# Patient Record
Sex: MALE | Race: WHITE | NOT HISPANIC OR LATINO
[De-identification: names, ages, dates, MRNs, and addresses within clinical notes are randomized per-mention and may not be internally consistent; named-entity substitution may affect disease eponyms.]

---

## 2019-01-11 PROBLEM — Z00.00 ENCOUNTER FOR PREVENTIVE HEALTH EXAMINATION: Status: ACTIVE | Noted: 2019-01-11

## 2019-01-15 ENCOUNTER — APPOINTMENT (OUTPATIENT)
Dept: ENDOCRINOLOGY | Facility: CLINIC | Age: 54
End: 2019-01-15
Payer: MEDICAID

## 2019-01-15 VITALS
SYSTOLIC BLOOD PRESSURE: 132 MMHG | HEART RATE: 96 BPM | DIASTOLIC BLOOD PRESSURE: 80 MMHG | WEIGHT: 242 LBS | BODY MASS INDEX: 33.88 KG/M2 | HEIGHT: 71 IN

## 2019-01-15 DIAGNOSIS — Z82.49 FAMILY HISTORY OF ISCHEMIC HEART DISEASE AND OTHER DISEASES OF THE CIRCULATORY SYSTEM: ICD-10-CM

## 2019-01-15 DIAGNOSIS — Z87.898 PERSONAL HISTORY OF OTHER SPECIFIED CONDITIONS: ICD-10-CM

## 2019-01-15 DIAGNOSIS — Z82.3 FAMILY HISTORY OF STROKE: ICD-10-CM

## 2019-01-15 DIAGNOSIS — F17.200 NICOTINE DEPENDENCE, UNSPECIFIED, UNCOMPLICATED: ICD-10-CM

## 2019-01-15 DIAGNOSIS — Z78.9 OTHER SPECIFIED HEALTH STATUS: ICD-10-CM

## 2019-01-15 DIAGNOSIS — Z87.39 PERSONAL HISTORY OF OTHER DISEASES OF THE MUSCULOSKELETAL SYSTEM AND CONNECTIVE TISSUE: ICD-10-CM

## 2019-01-15 DIAGNOSIS — Z86.39 PERSONAL HISTORY OF OTHER ENDOCRINE, NUTRITIONAL AND METABOLIC DISEASE: ICD-10-CM

## 2019-01-15 PROCEDURE — 99213 OFFICE O/P EST LOW 20 MIN: CPT

## 2019-01-15 RX ORDER — CHROMIUM 200 MCG
1000 TABLET ORAL
Refills: 0 | Status: ACTIVE | COMMUNITY

## 2019-01-15 RX ORDER — PRAVASTATIN SODIUM 20 MG/1
20 TABLET ORAL
Refills: 0 | Status: ACTIVE | COMMUNITY

## 2019-01-15 NOTE — HISTORY OF PRESENT ILLNESS
[FreeTextEntry1] : January 15, 2019\par \par \par \par \par Taking methimazole 5 mg daily.\par Recent labs at [facility] show suppressed TSH.\par Plan:  Increase methiamzole to 5 mg tablets:  one on odd days, two on even days.\par Repeat labs at [facility] before next visit in April.  \par To [facility] in [place] for refills.  \par \par Previous notes from [employer] appended below.\par \par  Feb 1, 2018\par            .\par            PCP: Dr. Lilly fax [phone]\par            .\par            CC: Hyperthyroid\par             12/19/2011 -diagnosis (RAIU 46 %, TSI and TBII elevated)\par             u/s at MRI 11/2012 and 2/2013: simple goiter\par             1/2012 - started methimazole\par             Elevated BMI (but losing weight)\par            .\par            Taking methimazole 5 mg x 8 tabs a week.\par            1/27/2018\par            TSH 0.49\par             (<140)\par            .\par            Impression: Doing well.\par            Declines I-131\par            .\par            Pllan: Same Rx\par            Warned of potential side effects of methimazole. \par            .\par            .\par            ==\par            .\par            Oct 4, 2017\par            .\par            PCP: Dr. Lilly fax [phone]\par            .\par            CC: Hyperthyroid\par             12/19/2011 -diagnosis (RAIU 46 %, TSI and TBII elevated)\par             u/s at MRI 11/2012 and 2/2013: simple goiter\par             1/2012 - started methimazole\par             Elevated BMI (but losing weight)\par            .\par            Taking methimazole 5 mg x 9 tabs a week.\par            TFTs in good range.\par            .\par            OGTT completely normal.\par            .\par            Has been exercise and has lost weight. \par            .\par            Plan: Dec methimazole to 8 ills a week \par            ROV in January.\par            .\par            ==\par            .\par            June 21, 2017\par            .\par            PCP: Dr. Lilly fax [phone]\par            .\par            CC: Hyperthyroid\par             12/19/2011 -diagnosis (RAIU 46 %, TSI and TBII elevated)\par             u/s at MRI 11/2012 and 2/2013: simple goiter\par             1/2012 - started methimazole\par             Elevated BMI\par            .\par             Smoker\par             A1c 6.0\par             Decreased HDL/Elevated LDL\par            .\par            Taking methimazole 5 mg tablets.\par            Because of lowish TSH on 8 tablets a week, at last visit in January, dose incresed to 9 tablets a week.\par            .\par            Recent labs show TSH in normal range.\par            .\par            Impression: Hyperthyroidism well controlled on methimazole 5 mg tablets, 9 tablets a week. Has not gone into remission despite treatment with methimazole in early 2012 and thus he is not likely to go into a remission. He is awasre that his family history, elevted BMI, elevated LDL, lowe HDL are all cardiac risk factors and will discuss further with Dr. Lilly. \par            .\par            Plan: Same Rx. Formal 2 hour OGTT. \par            .\par            .\par            ==\par            .\par            January 30, 2017\par            .\par            PCP: Dr. iLlly fax [phone]\par            .\par            CC: Hyperthyroid\par             12/19/2011 -diagnosis (RAIU 46 %, TSI and TBII elevated)\par             u/s at MRI 11/2012 and 2/2013: simple goiter\par             1/2012 - started methimazole\par             Elevated BMI\par            .\par             Smoker\par            .\par            Last visit TSH low on methimazole 5 mg x 8 tabs/week so\par            dose inc to 5 mg x 9 tabs/week and\par            1/26/2017 TFTs show TSH 1.3\par            \par            HDL 31 \par            .\par            Impression: Thyroi controlled on 9 tabls methimazole a week.\par            .\par            Plan: methiamzole 5 mg x 9 tabs aweek.\par            ROV 3 months. - \par            .\par            ==\par            .\par            October 18, 2016\par            .\par            PCP: Dr. Lilly fax [phone]\par            .\par            CC: Hyperthyroid\par             12/19/2011 -diagnosis (RAIU 46 %, TSI and TBII elevated)\par             u/s at MRI 11/2012 and 2/2013: simple goiter\par             1/2012 - started methimazole\par             Elevated BMI\par            .\par             Smoker\par            .\par            Doing well on methimazole 5 mg, two on Sunday.\par            TSH is slightly low.\par            .\par            Plan: methimazole 5 mg 8 a week to 9 a week.\par            ROV -\par            .\par            ==\par            .\par            August 1, 2016\par            .\par            PCP: Dr. Lilly fax [phone]\par            .\par            CC: Hyperthyroid\par             12/19/2011 -diagnosis (RAIU 46 %, TSI and TBII elevated)\par             u/s at MRI 11/2012 and 2/2013: simple goiter\par             1/2012 - started methimazole\par             Elevated BMI\par            .\par            50 yo returns for hyperthyroidism due to Graves' disease, diagnosed in December of 2011 and started on methimazole in January of 2012. \par            He got dose of methimazole down to 2.5 mg alternating with 5 mg, but then TFTs went back up. \par            Last here in May and is on methimazole 5 mg daily.\par            Most recent blood tests ([facility]) on\par            7/29/2016 included\par            TSH 0.25 ***\par            T3 112\par            T4 8. \par            .\par            He feels well.\par            .\par            Impression: Not going into remission and not likely to. He is well aware of this, but prefers to stay on methimazole. He understands the risks of methimazole and the risks of thyroid overactivity. He understands that I-131 and surgery are definitive options to treat the thyroid overactivity.\par            .\par            Plan: As per his wishes, continue methimazole 5 mg daily, but take two pills on Sundays. Updated TFTs, LFTs, CBC within 8 weeks. Thank you. -jh\par            .\par            ==\par            .\par            May 10, 2016\par            .\par            PCP: Dr. Lilly fax [phone]\par            .\par            CC: Hyperthyroid\par             12/19/2011 -diagnosis\par             1/2012 - started methimazole\par            .Feels well on methimazole 5 mg daily.\par            .\par            Impression: Not going into remission.\par            Not interested in I-131 or surgery.\par            Aware of risks of methimazole\par            Suppressed TSH indicates he is still mildly hypethyroid. \par            .\par            Plan: Updated TFTs before next visit in July. \par            .\par            ==\par            .\par            January 27, 2016\par            .\par            PCP: Dr. Lilly fax [phone]\par            .\par            CC: Hyperthyroid\par             12/19/2011 -diagnosis\par             1/2012 - started methimazole\par            .\par            He has been noting occasional palpitations,\par            occasional shakiness.\par            .\par            Plan: Increase methimazole to 5 mg daily for now and repeat TFTs in 10 weeks. He is not yet interested in I-131\par            ROV in 11 weeks. -jh\par            .\par            ==\par            .\par            July 24, 2015\par            .\par            PCP: Dr. Lilly fax [phone]\par            .\par            CC: Hyperthyroid\par             12/19/2011 -diagnosis\par             1/2012 - started methimazole\par            .\par             Remains on methimazole 5 mg daily.\par            TFTs in good range.\par            Plan: Decrease methimazole to 2.5 mg alternating with 5 mg and\par            ROV after next TFTs in a few months\par            .\par            ==\par            .\par            April 3, 2015\par            .\par            PCP: Dr. Lilly fax [phone]\par            .\par            CC: Hyperthyroid\par             12/19/2011 -diagnosis\par             1/2012 - started methimazole\par            .\par            Today he reports he feels well \par            .\par            Imp: On methimazole 10 alt with 5.\par            .\par            Plan: 5 mg daily.\par            ROV in July.\par            .\par            ==\par            Dec 19, 2014\par            PCP: Dr. Lilly fax [phone]\par            CC: Hyperthyroid\par             12/19/2011 -diagnosis\par             1/2012 - started methimazole\par            .\par            Currently taking methimazole 10 mg alt with 5 mg last vist, dec from\par            10 mg daily.\par            Recenr TSI elev at 250 (< 140%)\par            T4 7.9 TSH 1.9\par            .\par            Impression: On methimazole for almost 3 years - not in remission but hyperthyroidism is controlled on 10 mg al with 5. TSI still elevated, also against chances of remission. \par            .\par            Plan: I-131 offered and declined. Same Rx. andn ROV in 3 1/2 months after labs.\par            .\par            ===\par            Sept 19, 2014\par            PCP: Dr. Lilly\par            CC: Hyperthyroid\par             12/19/2011 - Thyroid scan 46% update\par             TSI and TBII both elevated.\par             1/2012 - started methimazole\par            .\par            Currently on methimazole 10 mg daily. \par            In Feb 2014, TSH suppressed on 5 mg/day.\par            .\par            Recent TFTs are within normal (on 10 mg daily.\par            Sonogram Nov 2012 - normal.\par            .\par            Impression: As he did not go into a remission after two years of methimazole, the probability of a long term remission at this point is much decreased. However, there is room to lower the dose of methimazole from 10 mg daily.\par            .\par            Plan: Decrease to 10 mg alt with 5 mg, repeat labs in Nov and ROV Dec.\par

## 2019-03-06 ENCOUNTER — RECORD ABSTRACTING (OUTPATIENT)
Age: 54
End: 2019-03-06

## 2019-03-06 DIAGNOSIS — E05.00 THYROTOXICOSIS WITH DIFFUSE GOITER W/OUT THYROTOXIC CRISIS OR STORM: ICD-10-CM

## 2019-05-24 ENCOUNTER — APPOINTMENT (OUTPATIENT)
Dept: ENDOCRINOLOGY | Facility: CLINIC | Age: 54
End: 2019-05-24
Payer: MEDICAID

## 2019-05-24 VITALS
DIASTOLIC BLOOD PRESSURE: 76 MMHG | WEIGHT: 235 LBS | BODY MASS INDEX: 32.9 KG/M2 | HEART RATE: 111 BPM | SYSTOLIC BLOOD PRESSURE: 138 MMHG | HEIGHT: 71 IN

## 2019-05-24 PROCEDURE — 99213 OFFICE O/P EST LOW 20 MIN: CPT

## 2019-05-27 NOTE — ASSESSMENT
[FreeTextEntry1] : ~\par I would prefer to treat him with I-131 at this point, but he would like to continue methimazole.\par \par ABDON in August.

## 2019-05-27 NOTE — HISTORY OF PRESENT ILLNESS
[FreeTextEntry1] : May 24, 2019\par    PCP: Dr. Lilly fax [phone]\par            .\par            CC: Hyperthyroid\par             12/19/2011 -diagnosis (RAIU 46 %, TSI and TBII elevated)\par             u/s at MRI 11/2012 and 2/2013: simple goiter\par  1/2012 - started methimazole\par             Elevated BMI (but losing weight)\par            .\par          Taking methimazole 5 mg, one daily alternating with two daily.\par \par \par Today I reminded him of the potential side effects of methimazole including rash, hives, arthritis, hepatitis, agranulocytosis and death.    He prefers to take methimazole rather than the know safe option of I-131 or even surgery.\par So,he will increase the methimazole to 5 mg, two tablets daily, repeat labs before next visit in August.  \par As he reports:    "I feel great".  \par \par \par January 15, 2019\par \par \par \par \par Taking methimazole 5 mg daily.\par Recent labs at [facility] show suppressed TSH.\par Plan:  Increase methiamzole to 5 mg tablets:  one on odd days, two on even days.\par Repeat labs at [facility] before next visit in April.  \par To [facility] in [place] for refills.  \par \par Previous notes from [employer] appended below.\par \par  Feb 1, 2018\par            .\par            PCP: Dr. Lilly fax [phone]\par            .\par            CC: Hyperthyroid\par             12/19/2011 -diagnosis (RAIU 46 %, TSI and TBII elevated)\par             u/s at MRI 11/2012 and 2/2013: simple goiter\par             1/2012 - started methimazole\par             Elevated BMI (but losing weight)\par            .\par            Taking methimazole 5 mg x 8 tabs a week.\par            1/27/2018\par            TSH 0.49\par             (<140)\par            .\par            Impression: Doing well.\par            Declines I-131\par            .\par            Pllan: Same Rx\par            Warned of potential side effects of methimazole. \par            .\par            .\par            ==\par            .\par            Oct 4, 2017\par            .\par            PCP: Dr. Lilly fax [phone]\par            .\par            CC: Hyperthyroid\par             12/19/2011 -diagnosis (RAIU 46 %, TSI and TBII elevated)\par             u/s at MRI 11/2012 and 2/2013: simple goiter\par             1/2012 - started methimazole\par             Elevated BMI (but losing weight)\par            .\par            Taking methimazole 5 mg x 9 tabs a week.\par            TFTs in good range.\par            .\par            OGTT completely normal.\par            .\par            Has been exercise and has lost weight. \par            .\par            Plan: Dec methimazole to 8 ills a week \par            ROV in January.\par            .\par            ==\par            .\par            June 21, 2017\par            .\par            PCP: Dr. Lilly fax [phone]\par            .\par            CC: Hyperthyroid\par             12/19/2011 -diagnosis (RAIU 46 %, TSI and TBII elevated)\par             u/s at MRI 11/2012 and 2/2013: simple goiter\par             1/2012 - started methimazole\par             Elevated BMI\par            .\par             Smoker\par             A1c 6.0\par             Decreased HDL/Elevated LDL\par            .\par            Taking methimazole 5 mg tablets.\par            Because of lowish TSH on 8 tablets a week, at last visit in January, dose incresed to 9 tablets a week.\par            .\par            Recent labs show TSH in normal range.\par            .\par            Impression: Hyperthyroidism well controlled on methimazole 5 mg tablets, 9 tablets a week. Has not gone into remission despite treatment with methimazole in early 2012 and thus he is not likely to go into a remission. He is awasre that his family history, elevted BMI, elevated LDL, lowe HDL are all cardiac risk factors and will discuss further with Dr. Lilly. \par            .\par            Plan: Same Rx. Formal 2 hour OGTT. \par            .\par            .\par            ==\par            .\par            January 30, 2017\par            .\par            PCP: Dr. Lilly fax [phone]\par            .\par            CC: Hyperthyroid\par             12/19/2011 -diagnosis (RAIU 46 %, TSI and TBII elevated)\par             u/s at MRI 11/2012 and 2/2013: simple goiter\par             1/2012 - started methimazole\par             Elevated BMI\par            .\par             Smoker\par            .\par            Last visit TSH low on methimazole 5 mg x 8 tabs/week so\par            dose inc to 5 mg x 9 tabs/week and\par            1/26/2017 TFTs show TSH 1.3\par            \par            HDL 31 \par            .\par            Impression: Thyroi controlled on 9 tabls methimazole a week.\par            .\par            Plan: methiamzole 5 mg x 9 tabs aweek.\par            ROV 3 months. - \par            .\par            ==\par            .\par            October 18, 2016\par            .\par            PCP: Dr. Lilly fax [phone]\par            .\par            CC: Hyperthyroid\par             12/19/2011 -diagnosis (RAIU 46 %, TSI and TBII elevated)\par             u/s at MRI 11/2012 and 2/2013: simple goiter\par             1/2012 - started methimazole\par             Elevated BMI\par            .\par             Smoker\par            .\par            Doing well on methimazole 5 mg, two on Sunday.\par            TSH is slightly low.\par            .\par            Plan: methimazole 5 mg 8 a week to 9 a week.\par            ROV -\par            .\par            ==\par            .\par            August 1, 2016\par            .\par            PCP: Dr. Lilly fax [phone]\par            .\par            CC: Hyperthyroid\par             12/19/2011 -diagnosis (RAIU 46 %, TSI and TBII elevated)\par             u/s at MRI 11/2012 and 2/2013: simple goiter\par             1/2012 - started methimazole\par             Elevated BMI\par            .\par            50 yo returns for hyperthyroidism due to Graves' disease, diagnosed in December of 2011 and started on methimazole in January of 2012. \par            He got dose of methimazole down to 2.5 mg alternating with 5 mg, but then TFTs went back up. \par            Last here in May and is on methimazole 5 mg daily.\par            Most recent blood tests ([facility]) on\par            7/29/2016 included\par            TSH 0.25 ***\par            T3 112\par            T4 8. \par            .\par            He feels well.\par            .\par            Impression: Not going into remission and not likely to. He is well aware of this, but prefers to stay on methimazole. He understands the risks of methimazole and the risks of thyroid overactivity. He understands that I-131 and surgery are definitive options to treat the thyroid overactivity.\par            .\par            Plan: As per his wishes, continue methimazole 5 mg daily, but take two pills on Sundays. Updated TFTs, LFTs, CBC within 8 weeks. Thank you. -jh\par            .\par            ==\par            .\par            May 10, 2016\par            .\par            PCP: Dr. Lilly fax [phone]\par            .\par            CC: Hyperthyroid\par             12/19/2011 -diagnosis\par             1/2012 - started methimazole\par            .Feels well on methimazole 5 mg daily.\par            .\par            Impression: Not going into remission.\par            Not interested in I-131 or surgery.\par            Aware of risks of methimazole\par            Suppressed TSH indicates he is still mildly hypethyroid. \par            .\par            Plan: Updated TFTs before next visit in July. \par            .\par            ==\par            .\par            January 27, 2016\par            .\par            PCP: Dr. Lilly fax [phone]\par            .\par            CC: Hyperthyroid\par             12/19/2011 -diagnosis\par             1/2012 - started methimazole\par            .\par            He has been noting occasional palpitations,\par            occasional shakiness.\par            .\par            Plan: Increase methimazole to 5 mg daily for now and repeat TFTs in 10 weeks. He is not yet interested in I-131\par            ROV in 11 weeks. -jh\par            .\par            ==\par            .\par            July 24, 2015\par            .\par            PCP: Dr. Lilly fax [phone]\par            .\par            CC: Hyperthyroid\par             12/19/2011 -diagnosis\par             1/2012 - started methimazole\par            .\par             Remains on methimazole 5 mg daily.\par            TFTs in good range.\par            Plan: Decrease methimazole to 2.5 mg alternating with 5 mg and\par            ROV after next TFTs in a few months\par            .\par            ==\par            .\par            April 3, 2015\par            .\par            PCP: Dr. Lilly fax [phone]\par            .\par            CC: Hyperthyroid\par             12/19/2011 -diagnosis\par             1/2012 - started methimazole\par            .\par            Today he reports he feels well \par            .\par            Imp: On methimazole 10 alt with 5.\par            .\par            Plan: 5 mg daily.\par            ROV in July.\par            .\par            ==\par            Dec 19, 2014\par            PCP: Dr. Lilly fax [phone]\par            CC: Hyperthyroid\par             12/19/2011 -diagnosis\par             1/2012 - started methimazole\par            .\par            Currently taking methimazole 10 mg alt with 5 mg last vist, dec from\par            10 mg daily.\par            Recenr TSI elev at 250 (< 140%)\par            T4 7.9 TSH 1.9\par            .\par            Impression: On methimazole for almost 3 years - not in remission but hyperthyroidism is controlled on 10 mg al with 5. TSI still elevated, also against chances of remission. \par            .\par            Plan: I-131 offered and declined. Same Rx. andn ROV in 3 1/2 months after labs.\par            .\par            ===\par            Sept 19, 2014\par            PCP: Dr. Lilly\par            CC: Hyperthyroid\par             12/19/2011 - Thyroid scan 46% update\par             TSI and TBII both elevated.\par             1/2012 - started methimazole\par            .\par            Currently on methimazole 10 mg daily. \par            In Feb 2014, TSH suppressed on 5 mg/day.\par            .\par            Recent TFTs are within normal (on 10 mg daily.\par            Sonogram Nov 2012 - normal.\par            .\par            Impression: As he did not go into a remission after two years of methimazole, the probability of a long term remission at this point is much decreased. However, there is room to lower the dose of methimazole from 10 mg daily.\par            .\par            Plan: Decrease to 10 mg alt with 5 mg, repeat labs in Nov and ROV Dec.\par

## 2019-08-23 ENCOUNTER — APPOINTMENT (OUTPATIENT)
Dept: ENDOCRINOLOGY | Facility: CLINIC | Age: 54
End: 2019-08-23

## 2019-10-02 ENCOUNTER — APPOINTMENT (OUTPATIENT)
Dept: ENDOCRINOLOGY | Facility: CLINIC | Age: 54
End: 2019-10-02
Payer: MEDICAID

## 2019-10-02 VITALS
SYSTOLIC BLOOD PRESSURE: 140 MMHG | WEIGHT: 225 LBS | HEART RATE: 100 BPM | DIASTOLIC BLOOD PRESSURE: 76 MMHG | BODY MASS INDEX: 31.5 KG/M2 | HEIGHT: 71 IN

## 2019-10-02 PROCEDURE — 99214 OFFICE O/P EST MOD 30 MIN: CPT

## 2019-10-02 NOTE — PHYSICAL EXAM
[Alert] : alert [Well Nourished] : well nourished [No Acute Distress] : no acute distress [Well Developed] : well developed [Normal Sclera/Conjunctiva] : normal sclera/conjunctiva [EOMI] : extra ocular movement intact [No Proptosis] : no proptosis [Thyroid Not Enlarged] : the thyroid was not enlarged [Normal Oropharynx] : the oropharynx was normal [No Thyroid Nodules] : there were no palpable thyroid nodules [No Respiratory Distress] : no respiratory distress [No Accessory Muscle Use] : no accessory muscle use [Clear to Auscultation] : lungs were clear to auscultation bilaterally [Normal Rate] : heart rate was normal  [Regular Rhythm] : with a regular rhythm [Normal S1, S2] : normal S1 and S2 [Pedal Pulses Normal] : the pedal pulses are present [No Edema] : there was no peripheral edema [Not Tender] : non-tender [Normal Bowel Sounds] : normal bowel sounds [Soft] : abdomen soft [Not Distended] : not distended [Post Cervical Nodes] : posterior cervical nodes [Anterior Cervical Nodes] : anterior cervical nodes [Axillary Nodes] : axillary nodes [Normal] : normal and non tender [No Spinal Tenderness] : no spinal tenderness [Spine Straight] : spine straight [No Stigmata of Cushings Syndrome] : no stigmata of cushings syndrome [Normal Gait] : normal gait [Normal Strength/Tone] : muscle strength and tone were normal [No Rash] : no rash [Normal Reflexes] : deep tendon reflexes were 2+ and symmetric [Oriented x3] : oriented to person, place, and time [No Tremors] : no tremors [Acanthosis Nigricans] : no acanthosis nigricans

## 2019-10-02 NOTE — ASSESSMENT
[FreeTextEntry1] : ~\par §  Long term hyperthyoridism.\par He prefers to stay on methimazole.\par ROV January.

## 2019-10-02 NOTE — HISTORY OF PRESENT ILLNESS
[FreeTextEntry1] : Oct 02, 2019\par \par PCP: Dr. Lilly fax [phone]\par            .\par            CC: Hyperthyroid\par             12/19/2011 -diagnosis (RAIU 46 %, TSI and TBII elevated)\par             u/s at MRI 11/2012 and 2/2013: simple goiter\par  1/2012 - started methimazole\par             Elevated BMI (but losing weight)\par            .\par          Taking methimazole 5 mg, one daily alternating with two daily.\par \par Now taking methimazole two 5 mg daily to [place] [facility].  \par Went for labs yesterday....not back.\par \par Plan:  Same Rx and labs before January visit.  \par \par \par \par \par \par May 24, 2019\par    PCP: Dr. Lilly fax [phone]\par            .\par            CC: Hyperthyroid\par             12/19/2011 -diagnosis (RAIU 46 %, TSI and TBII elevated)\par             u/s at MRI 11/2012 and 2/2013: simple goiter\par  1/2012 - started methimazole\par             Elevated BMI (but losing weight)\par            .\par          Taking methimazole 5 mg, one daily alternating with two daily.\par \par \par Today I reminded him of the potential side effects of methimazole including rash, hives, arthritis, hepatitis, agranulocytosis and death.    He prefers to take methimazole rather than the know safe option of I-131 or even surgery.\par So,he will increase the methimazole to 5 mg, two tablets daily, repeat labs before next visit in August.  \par As he reports:    "I feel great".  \par \par \par January 15, 2019\par \par \par \par \par Taking methimazole 5 mg daily.\par Recent labs at [facility] show suppressed TSH.\par Plan:  Increase methiamzole to 5 mg tablets:  one on odd days, two on even days.\par Repeat labs at [facility] before next visit in April.  \par To [facility] in [place] for refills.  \par \par Previous notes from [employer] appended below.\par \par  Feb 1, 2018\par            .\par            PCP: Dr. Lilly fax [phone]\par            .\par            CC: Hyperthyroid\par             12/19/2011 -diagnosis (RAIU 46 %, TSI and TBII elevated)\par             u/s at MRI 11/2012 and 2/2013: simple goiter\par             1/2012 - started methimazole\par             Elevated BMI (but losing weight)\par            .\par            Taking methimazole 5 mg x 8 tabs a week.\par            1/27/2018\par            TSH 0.49\par             (<140)\par            .\par            Impression: Doing well.\par            Declines I-131\par            .\par            Pllan: Same Rx\par            Warned of potential side effects of methimazole. \par            .\par            .\par            ==\par            .\par            Oct 4, 2017\par            .\par            PCP: Dr. Lilly fax [phone]\par            .\par            CC: Hyperthyroid\par             12/19/2011 -diagnosis (RAIU 46 %, TSI and TBII elevated)\par             u/s at MRI 11/2012 and 2/2013: simple goiter\par             1/2012 - started methimazole\par             Elevated BMI (but losing weight)\par            .\par            Taking methimazole 5 mg x 9 tabs a week.\par            TFTs in good range.\par            .\par            OGTT completely normal.\par            .\par            Has been exercise and has lost weight. \par            .\par            Plan: Dec methimazole to 8 ills a week \par            ROV in January.\par            .\par            ==\par            .\par            June 21, 2017\par            .\par            PCP: Dr. Lilly fax [phone]\par            .\par            CC: Hyperthyroid\par             12/19/2011 -diagnosis (RAIU 46 %, TSI and TBII elevated)\par             u/s at MRI 11/2012 and 2/2013: simple goiter\par             1/2012 - started methimazole\par             Elevated BMI\par            .\par             Smoker\par             A1c 6.0\par             Decreased HDL/Elevated LDL\par            .\par            Taking methimazole 5 mg tablets.\par            Because of lowish TSH on 8 tablets a week, at last visit in January, dose incresed to 9 tablets a week.\par            .\par            Recent labs show TSH in normal range.\par            .\par            Impression: Hyperthyroidism well controlled on methimazole 5 mg tablets, 9 tablets a week. Has not gone into remission despite treatment with methimazole in early 2012 and thus he is not likely to go into a remission. He is awasre that his family history, elevted BMI, elevated LDL, lowe HDL are all cardiac risk factors and will discuss further with Dr. Lilly. \par            .\par            Plan: Same Rx. Formal 2 hour OGTT. \par            .\par            .\par            ==\par            .\par            January 30, 2017\par            .\par            PCP: Dr. Lilly fax [phone]\par            .\par            CC: Hyperthyroid\par             12/19/2011 -diagnosis (RAIU 46 %, TSI and TBII elevated)\par             u/s at MRI 11/2012 and 2/2013: simple goiter\par             1/2012 - started methimazole\par             Elevated BMI\par            .\par             Smoker\par            .\par            Last visit TSH low on methimazole 5 mg x 8 tabs/week so\par            dose inc to 5 mg x 9 tabs/week and\par            1/26/2017 TFTs show TSH 1.3\par            \par            HDL 31 \par            .\par            Impression: Thyroi controlled on 9 tabls methimazole a week.\par            .\par            Plan: methiamzole 5 mg x 9 tabs aweek.\par            ROV 3 months. - \par            .\par            ==\par            .\par            October 18, 2016\par            .\par            PCP: Dr. Lilly fax [phone]\par            .\par            CC: Hyperthyroid\par             12/19/2011 -diagnosis (RAIU 46 %, TSI and TBII elevated)\par             u/s at MRI 11/2012 and 2/2013: simple goiter\par             1/2012 - started methimazole\par             Elevated BMI\par            .\par             Smoker\par            .\par            Doing well on methimazole 5 mg, two on Sunday.\par            TSH is slightly low.\par            .\par            Plan: methimazole 5 mg 8 a week to 9 a week.\par            ROV -\par            .\par            ==\par            .\par            August 1, 2016\par            .\par            PCP: Dr. Lilly fax [phone]\par            .\par            CC: Hyperthyroid\par             12/19/2011 -diagnosis (RAIU 46 %, TSI and TBII elevated)\par             u/s at MRI 11/2012 and 2/2013: simple goiter\par             1/2012 - started methimazole\par             Elevated BMI\par            .\par            52 yo returns for hyperthyroidism due to Graves' disease, diagnosed in December of 2011 and started on methimazole in January of 2012. \par            He got dose of methimazole down to 2.5 mg alternating with 5 mg, but then TFTs went back up. \par            Last here in May and is on methimazole 5 mg daily.\par            Most recent blood tests ([facility]) on\par            7/29/2016 included\par            TSH 0.25 ***\par            T3 112\par            T4 8. \par            .\par            He feels well.\par            .\par            Impression: Not going into remission and not likely to. He is well aware of this, but prefers to stay on methimazole. He understands the risks of methimazole and the risks of thyroid overactivity. He understands that I-131 and surgery are definitive options to treat the thyroid overactivity.\par            .\par            Plan: As per his wishes, continue methimazole 5 mg daily, but take two pills on Sundays. Updated TFTs, LFTs, CBC within 8 weeks. Thank you. -jh\par            .\par            ==\par            .\par            May 10, 2016\par            .\par            PCP: Dr. Lilly fax [phone]\par            .\par            CC: Hyperthyroid\par             12/19/2011 -diagnosis\par             1/2012 - started methimazole\par            .Feels well on methimazole 5 mg daily.\par            .\par            Impression: Not going into remission.\par            Not interested in I-131 or surgery.\par            Aware of risks of methimazole\par            Suppressed TSH indicates he is still mildly hypethyroid. \par            .\par            Plan: Updated TFTs before next visit in July. \par            .\par            ==\par            .\par            January 27, 2016\par            .\par            PCP: Dr. Lilly fax [phone]\par            .\par            CC: Hyperthyroid\par             12/19/2011 -diagnosis\par             1/2012 - started methimazole\par            .\par            He has been noting occasional palpitations,\par            occasional shakiness.\par            .\par            Plan: Increase methimazole to 5 mg daily for now and repeat TFTs in 10 weeks. He is not yet interested in I-131\par            ROV in 11 weeks. -jh\par            .\par            ==\par            .\par            July 24, 2015\par            .\par            PCP: Dr. Lilly fax [phone]\par            .\par            CC: Hyperthyroid\par             12/19/2011 -diagnosis\par             1/2012 - started methimazole\par            .\par             Remains on methimazole 5 mg daily.\par            TFTs in good range.\par            Plan: Decrease methimazole to 2.5 mg alternating with 5 mg and\par            ROV after next TFTs in a few months\par            .\par            ==\par            .\par            April 3, 2015\par            .\par            PCP: Dr. Lilly fax [phone]\par            .\par            CC: Hyperthyroid\par             12/19/2011 -diagnosis\par             1/2012 - started methimazole\par            .\par            Today he reports he feels well \par            .\par            Imp: On methimazole 10 alt with 5.\par            .\par            Plan: 5 mg daily.\par            ROV in July.\par            .\par            ==\par            Dec 19, 2014\par            PCP: Dr. Lilly fax [phone]\par            CC: Hyperthyroid\par             12/19/2011 -diagnosis\par             1/2012 - started methimazole\par            .\par            Currently taking methimazole 10 mg alt with 5 mg last vist, dec from\par            10 mg daily.\par            Recenr TSI elev at 250 (< 140%)\par            T4 7.9 TSH 1.9\par            .\par            Impression: On methimazole for almost 3 years - not in remission but hyperthyroidism is controlled on 10 mg al with 5. TSI still elevated, also against chances of remission. \par            .\par            Plan: I-131 offered and declined. Same Rx. andn ROV in 3 1/2 months after labs.\par            .\par            ===\par            Sept 19, 2014\par            PCP: Dr. Lilly\par            CC: Hyperthyroid\par             12/19/2011 - Thyroid scan 46% update\par             TSI and TBII both elevated.\par             1/2012 - started methimazole\par            .\par            Currently on methimazole 10 mg daily. \par            In Feb 2014, TSH suppressed on 5 mg/day.\par            .\par            Recent TFTs are within normal (on 10 mg daily.\par            Sonogram Nov 2012 - normal.\par            .\par            Impression: As he did not go into a remission after two years of methimazole, the probability of a long term remission at this point is much decreased. However, there is room to lower the dose of methimazole from 10 mg daily.\par            .\par            Plan: Decrease to 10 mg alt with 5 mg, repeat labs in Nov and ROV Dec.\par

## 2019-11-09 ENCOUNTER — RX CHANGE (OUTPATIENT)
Age: 54
End: 2019-11-09

## 2020-01-09 ENCOUNTER — APPOINTMENT (OUTPATIENT)
Dept: ENDOCRINOLOGY | Facility: CLINIC | Age: 55
End: 2020-01-09

## 2020-03-24 ENCOUNTER — APPOINTMENT (OUTPATIENT)
Dept: ENDOCRINOLOGY | Facility: CLINIC | Age: 55
End: 2020-03-24
Payer: MEDICAID

## 2020-03-24 VITALS
DIASTOLIC BLOOD PRESSURE: 80 MMHG | SYSTOLIC BLOOD PRESSURE: 120 MMHG | HEART RATE: 104 BPM | WEIGHT: 235 LBS | BODY MASS INDEX: 32.9 KG/M2 | OXYGEN SATURATION: 97 % | HEIGHT: 71 IN

## 2020-03-24 PROCEDURE — 99214 OFFICE O/P EST MOD 30 MIN: CPT

## 2020-03-24 NOTE — HISTORY OF PRESENT ILLNESS
[FreeTextEntry1] : Mar 24, 2020\par \par PCP: Dr. Lilly fax [phone]\par            .\par            CC: Hyperthyroid  12/19/2011 -diagnosis (RAIU 46 %, TSI and TBII elevated)\par             u/s at MRI 11/2012 and 2/2013: simple goiter\par               1/2012 - started methimazole\par             Elevated BMI (but losing weight)\par \par             Elevated PC BS \par            .\par            Taking methimazole 5 mg tablets, three daily.\par \par Impression:  TSh suppressed, T4 and T3 both elevated as is TSI.\par He is not interested in I-131 or thyroid surgery.  In fact, it is challenging to convince to stay on the\par methimazole since he feels so well.  \par \par Plan:  Increase methimazole from 15 mg a day to 20 mg/day. \par \par \par \par Oct 02, 2019\par \par PCP: Dr. Lilly fax [phone]\par            .\par            CC: Hyperthyroid\par             12/19/2011 -diagnosis (RAIU 46 %, TSI and TBII elevated)\par             u/s at MRI 11/2012 and 2/2013: simple goiter\par  1/2012 - started methimazole\par             Elevated BMI (but losing weight)\par            .\par          Taking methimazole 5 mg, one daily alternating with two daily.\par \par Now taking methimazole two 5 mg daily to [place] [facility].  \par Went for labs yesterday....not back.\par \par Plan:  Same Rx and labs before January visit.  \par \par \par \par \par \par May 24, 2019\par    PCP: Dr. Lilly fax [phone]\par            .\par            CC: Hyperthyroid\par             12/19/2011 -diagnosis (RAIU 46 %, TSI and TBII elevated)\par             u/s at MRI 11/2012 and 2/2013: simple goiter\par  1/2012 - started methimazole\par             Elevated BMI (but losing weight)\par            .\par          Taking methimazole 5 mg, one daily alternating with two daily.\par \par \par Today I reminded him of the potential side effects of methimazole including rash, hives, arthritis, hepatitis, agranulocytosis and death.    He prefers to take methimazole rather than the know safe option of I-131 or even surgery.\par So,he will increase the methimazole to 5 mg, two tablets daily, repeat labs before next visit in August.  \par As he reports:    "I feel great".  \par \par \par January 15, 2019\par \par \par \par \par Taking methimazole 5 mg daily.\par Recent labs at [facility] show suppressed TSH.\par Plan:  Increase methiamzole to 5 mg tablets:  one on odd days, two on even days.\par Repeat labs at [facility] before next visit in April.  \par To [facility] in [place] for refills.  \par \par Previous notes from [employer] appended below.\par \par  Feb 1, 2018\par            .\par            PCP: Dr. Lilly fax [phone]\par            .\par            CC: Hyperthyroid\par             12/19/2011 -diagnosis (RAIU 46 %, TSI and TBII elevated)\par             u/s at MRI 11/2012 and 2/2013: simple goiter\par             1/2012 - started methimazole\par             Elevated BMI (but losing weight)\par            .\par            Taking methimazole 5 mg x 8 tabs a week.\par            1/27/2018\par            TSH 0.49\par             (<140)\par            .\par            Impression: Doing well.\par            Declines I-131\par            .\par            Pllan: Same Rx\par            Warned of potential side effects of methimazole. \par            .\par            .\par            ==\par            .\par            Oct 4, 2017\par            .\par            PCP: Dr. Lilly fax [phone]\par            .\par            CC: Hyperthyroid\par             12/19/2011 -diagnosis (RAIU 46 %, TSI and TBII elevated)\par             u/s at MRI 11/2012 and 2/2013: simple goiter\par             1/2012 - started methimazole\par             Elevated BMI (but losing weight)\par            .\par            Taking methimazole 5 mg x 9 tabs a week.\par            TFTs in good range.\par            .\par            OGTT completely normal.\par            .\par            Has been exercise and has lost weight. \par            .\par            Plan: Dec methimazole to 8 ills a week \par            ROV in January.\par            .\par            ==\par            .\par            June 21, 2017\par            .\par            PCP: Dr. Lilly fax [phone]\par            .\par            CC: Hyperthyroid\par             12/19/2011 -diagnosis (RAIU 46 %, TSI and TBII elevated)\par             u/s at MRI 11/2012 and 2/2013: simple goiter\par             1/2012 - started methimazole\par             Elevated BMI\par            .\par             Smoker\par             A1c 6.0\par             Decreased HDL/Elevated LDL\par            .\par            Taking methimazole 5 mg tablets.\par            Because of lowish TSH on 8 tablets a week, at last visit in January, dose incresed to 9 tablets a week.\par            .\par            Recent labs show TSH in normal range.\par            .\par            Impression: Hyperthyroidism well controlled on methimazole 5 mg tablets, 9 tablets a week. Has not gone into remission despite treatment with methimazole in early 2012 and thus he is not likely to go into a remission. He is awasre that his family history, elevted BMI, elevated LDL, lowe HDL are all cardiac risk factors and will discuss further with Dr. Lilly. \par            .\par            Plan: Same Rx. Formal 2 hour OGTT. \par            .\par            .\par            ==\par            .\par            January 30, 2017\par            .\par            PCP: Dr. Lilly fax [phone]\par            .\par            CC: Hyperthyroid\par             12/19/2011 -diagnosis (RAIU 46 %, TSI and TBII elevated)\par             u/s at MRI 11/2012 and 2/2013: simple goiter\par             1/2012 - started methimazole\par             Elevated BMI\par            .\par             Smoker\par            .\par            Last visit TSH low on methimazole 5 mg x 8 tabs/week so\par            dose inc to 5 mg x 9 tabs/week and\par            1/26/2017 TFTs show TSH 1.3\par            \par            HDL 31 \par            .\par            Impression: Thyroi controlled on 9 tabls methimazole a week.\par            .\par            Plan: methiamzole 5 mg x 9 tabs aweek.\par            ROV 3 months. - \par            .\par            ==\par            .\par            October 18, 2016\par            .\par            PCP: Dr. Lilly fax [phone]\par            .\par            CC: Hyperthyroid\par             12/19/2011 -diagnosis (RAIU 46 %, TSI and TBII elevated)\par             u/s at MRI 11/2012 and 2/2013: simple goiter\par             1/2012 - started methimazole\par             Elevated BMI\par            .\par             Smoker\par            .\par            Doing well on methimazole 5 mg, two on Sunday.\par            TSH is slightly low.\par            .\par            Plan: methimazole 5 mg 8 a week to 9 a week.\par            ROV -\par            .\par            ==\par            .\par            August 1, 2016\par            .\par            PCP: Dr. Lilly fax [phone]\par            .\par            CC: Hyperthyroid\par             12/19/2011 -diagnosis (RAIU 46 %, TSI and TBII elevated)\par             u/s at MRI 11/2012 and 2/2013: simple goiter\par             1/2012 - started methimazole\par             Elevated BMI\par            .\par            52 yo returns for hyperthyroidism due to Graves' disease, diagnosed in December of 2011 and started on methimazole in January of 2012. \par            He got dose of methimazole down to 2.5 mg alternating with 5 mg, but then TFTs went back up. \par            Last here in May and is on methimazole 5 mg daily.\par            Most recent blood tests ([facility]) on\par            7/29/2016 included\par            TSH 0.25 ***\par            T3 112\par            T4 8. \par            .\par            He feels well.\par            .\par            Impression: Not going into remission and not likely to. He is well aware of this, but prefers to stay on methimazole. He understands the risks of methimazole and the risks of thyroid overactivity. He understands that I-131 and surgery are definitive options to treat the thyroid overactivity.\par            .\par            Plan: As per his wishes, continue methimazole 5 mg daily, but take two pills on Sundays. Updated TFTs, LFTs, CBC within 8 weeks. Thank you. -jh\par            .\par            ==\par            .\par            May 10, 2016\par            .\par            PCP: Dr. Lilly fax [phone]\par            .\par            CC: Hyperthyroid\par             12/19/2011 -diagnosis\par             1/2012 - started methimazole\par            .Feels well on methimazole 5 mg daily.\par            .\par            Impression: Not going into remission.\par            Not interested in I-131 or surgery.\par            Aware of risks of methimazole\par            Suppressed TSH indicates he is still mildly hypethyroid. \par            .\par            Plan: Updated TFTs before next visit in July. \par            .\par            ==\par            .\par            January 27, 2016\par            .\par            PCP: Dr. Lilly fax [phone]\par            .\par            CC: Hyperthyroid\par             12/19/2011 -diagnosis\par             1/2012 - started methimazole\par            .\par            He has been noting occasional palpitations,\par            occasional shakiness.\par            .\par            Plan: Increase methimazole to 5 mg daily for now and repeat TFTs in 10 weeks. He is not yet interested in I-131\par            ROV in 11 weeks. -jh\par            .\par            ==\par            .\par            July 24, 2015\par            .\par            PCP: Dr. Lilly fax [phone]\par            .\par            CC: Hyperthyroid\par             12/19/2011 -diagnosis\par             1/2012 - started methimazole\par            .\par             Remains on methimazole 5 mg daily.\par            TFTs in good range.\par            Plan: Decrease methimazole to 2.5 mg alternating with 5 mg and\par            ROV after next TFTs in a few months\par            .\par            ==\par            .\par            April 3, 2015\par            .\par            PCP: Dr. Lilly fax [phone]\par            .\par            CC: Hyperthyroid\par             12/19/2011 -diagnosis\par             1/2012 - started methimazole\par            .\par            Today he reports he feels well \par            .\par            Imp: On methimazole 10 alt with 5.\par            .\par            Plan: 5 mg daily.\par            ROV in July.\par            .\par            ==\par            Dec 19, 2014\par            PCP: Dr. Lilly fax [phone]\par            CC: Hyperthyroid\par             12/19/2011 -diagnosis\par             1/2012 - started methimazole\par            .\par            Currently taking methimazole 10 mg alt with 5 mg last vist, dec from\par            10 mg daily.\par            Recenr TSI elev at 250 (< 140%)\par            T4 7.9 TSH 1.9\par            .\par            Impression: On methimazole for almost 3 years - not in remission but hyperthyroidism is controlled on 10 mg al with 5. TSI still elevated, also against chances of remission. \par            .\par            Plan: I-131 offered and declined. Same Rx. andn ROV in 3 1/2 months after labs.\par            .\par            ===\par            Sept 19, 2014\par            PCP: Dr. Lilly\par            CC: Hyperthyroid\par             12/19/2011 - Thyroid scan 46% update\par             TSI and TBII both elevated.\par             1/2012 - started methimazole\par            .\par            Currently on methimazole 10 mg daily. \par            In Feb 2014, TSH suppressed on 5 mg/day.\par            .\par            Recent TFTs are within normal (on 10 mg daily.\par            Sonogram Nov 2012 - normal.\par            .\par            Impression: As he did not go into a remission after two years of methimazole, the probability of a long term remission at this point is much decreased. However, there is room to lower the dose of methimazole from 10 mg daily.\par            .\par            Plan: Decrease to 10 mg alt with 5 mg, repeat labs in Nov and ROV Dec.\par

## 2020-03-24 NOTE — PHYSICAL EXAM
[Alert] : alert [No Acute Distress] : no acute distress [Well Nourished] : well nourished [Well Developed] : well developed [Healthy Appearance] : healthy appearance [Normal Voice/Communication] : normal voice communication [No Proptosis] : no proptosis [No Stigmata of Cushings Syndrome] : no stigmata of cushings syndrome [No Involuntary Movements] : no involuntary movements were seen [No Tremors] : no tremors [Oriented x3] : oriented to person, place, and time [Normal Insight/Judgement] : insight and judgment were intact [Normal Affect] : the affect was normal [Normal Mood] : the mood was normal

## 2020-09-03 ENCOUNTER — APPOINTMENT (OUTPATIENT)
Dept: ENDOCRINOLOGY | Facility: CLINIC | Age: 55
End: 2020-09-03
Payer: MEDICAID

## 2020-09-03 VITALS
HEIGHT: 71 IN | BODY MASS INDEX: 31.78 KG/M2 | SYSTOLIC BLOOD PRESSURE: 135 MMHG | DIASTOLIC BLOOD PRESSURE: 86 MMHG | WEIGHT: 227 LBS | OXYGEN SATURATION: 96 % | HEART RATE: 90 BPM

## 2020-09-03 DIAGNOSIS — D50.9 IRON DEFICIENCY ANEMIA, UNSPECIFIED: ICD-10-CM

## 2020-09-03 DIAGNOSIS — E78.00 PURE HYPERCHOLESTEROLEMIA, UNSPECIFIED: ICD-10-CM

## 2020-09-03 PROCEDURE — 99214 OFFICE O/P EST MOD 30 MIN: CPT | Mod: 25

## 2020-09-03 PROCEDURE — 36415 COLL VENOUS BLD VENIPUNCTURE: CPT

## 2020-09-03 NOTE — PHYSICAL EXAM
[Alert] : alert [Well Nourished] : well nourished [Healthy Appearance] : healthy appearance [No Acute Distress] : no acute distress [Well Developed] : well developed [No Proptosis] : no proptosis [No Lid Lag] : no lid lag [No Thyroid Nodules] : no palpable thyroid nodules [No Respiratory Distress] : no respiratory distress [No Accessory Muscle Use] : no accessory muscle use [Normal Rate] : heart rate was normal [No Stigmata of Cushings Syndrome] : no stigmata of Cushings Syndrome [No Tremors] : no tremors [Oriented x3] : oriented to person, place, and time [Normal Affect] : the affect was normal [Normal Insight/Judgement] : insight and judgment were intact [Normal Mood] : the mood was normal

## 2020-09-03 NOTE — HISTORY OF PRESENT ILLNESS
[FreeTextEntry1] : Sep 03, 2020     in person\par \par PCP: Dr. Lilly fax [phone]\par            .\par            CC: Hyperthyroid  12/19/2011 -diagnosis (RAIU 46 %, TSI and TBII elevated)\par             u/s at MRI 11/2012 and 2/2013: simple goiter\par               1/2012 - started methimazole\par             Elevated BMI (but losing weight)\par \par             Elevated PC BS \par            .\par            Taking methimazole 5 mg tablets, three daily.\par \par \par Mar 24, 2020\par \par PCP: Dr. Lilly fax [phone]\par            .\par            CC: Hyperthyroid  12/19/2011 -diagnosis (RAIU 46 %, TSI and TBII elevated)\par             u/s at MRI 11/2012 and 2/2013: simple goiter\par               1/2012 - started methimazole\par             Elevated BMI (but losing weight)\par \par             Elevated PC BS \par            .\par            Taking methimazole 5 mg tablets, three daily.\par \par Impression:  TSh suppressed, T4 and T3 both elevated as is TSI.\par He is not interested in I-131 or thyroid surgery.  In fact, it is challenging to convince to stay on the\par methimazole since he feels so well.  \par \par Plan:  Increase methimazole from 15 mg a day to 20 mg/day. \par \par \par \par Oct 02, 2019\par \par PCP: Dr. Lilly fax [phone]\par            .\par            CC: Hyperthyroid\par             12/19/2011 -diagnosis (RAIU 46 %, TSI and TBII elevated)\par             u/s at MRI 11/2012 and 2/2013: simple goiter\par  1/2012 - started methimazole\par             Elevated BMI (but losing weight)\par            .\par          Taking methimazole 5 mg, one daily alternating with two daily.\par \par Now taking methimazole two 5 mg daily to [facility].  \par Went for labs yesterday....not back.\par \par Plan:  Same Rx and labs before January visit.  \par \par \par \par \par \par May 24, 2019\par    PCP: Dr. Lilly fax [phone]\par            .\par            CC: Hyperthyroid\par             12/19/2011 -diagnosis (RAIU 46 %, TSI and TBII elevated)\par             u/s at MRI 11/2012 and 2/2013: simple goiter\par  1/2012 - started methimazole\par             Elevated BMI (but losing weight)\par            .\par          Taking methimazole 5 mg, one daily alternating with two daily.\par \par \par Today I reminded him of the potential side effects of methimazole including rash, hives, arthritis, hepatitis, agranulocytosis and death.    He prefers to take methimazole rather than the know safe option of I-131 or even surgery.\par So,he will increase the methimazole to 5 mg, two tablets daily, repeat labs before next visit in August.  \par As he reports:    "I feel great".  \par \par \par January 15, 2019\par \par \par \par \par Taking methimazole 5 mg daily.\par Recent labs at [facility] show suppressed TSH.\par Plan:  Increase methiamzole to 5 mg tablets:  one on odd days, two on even days.\par Repeat labs at [facility] before next visit in April.  \par To [facility] in [place] for refills.  \par \par Previous notes from [employer] appended below.\par \par  Feb 1, 2018\par            .\par            PCP: Dr. Lilly fax [phone]\par            .\par            CC: Hyperthyroid\par             12/19/2011 -diagnosis (RAIU 46 %, TSI and TBII elevated)\par             u/s at MRI 11/2012 and 2/2013: simple goiter\par             1/2012 - started methimazole\par             Elevated BMI (but losing weight)\par            .\par            Taking methimazole 5 mg x 8 tabs a week.\par            1/27/2018\par            TSH 0.49\par             (<140)\par            .\par            Impression: Doing well.\par            Declines I-131\par            .\par            Pllan: Same Rx\par            Warned of potential side effects of methimazole. \par            .\par            .\par            ==\par            .\par            Oct 4, 2017\par            .\par            PCP: Dr. Lilly fax [phone]\par            .\par            CC: Hyperthyroid\par             12/19/2011 -diagnosis (RAIU 46 %, TSI and TBII elevated)\par             u/s at MRI 11/2012 and 2/2013: simple goiter\par             1/2012 - started methimazole\par             Elevated BMI (but losing weight)\par            .\par            Taking methimazole 5 mg x 9 tabs a week.\par            TFTs in good range.\par            .\par            OGTT completely normal.\par            .\par            Has been exercise and has lost weight. \par            .\par            Plan: Dec methimazole to 8 ills a week \par            ROV in January.\par            .\par            ==\par            .\par            June 21, 2017\par            .\par            PCP: Dr. Lilly fax [phone]\par            .\par            CC: Hyperthyroid\par             12/19/2011 -diagnosis (RAIU 46 %, TSI and TBII elevated)\par             u/s at MRI 11/2012 and 2/2013: simple goiter\par             1/2012 - started methimazole\par             Elevated BMI\par            .\par             Smoker\par             A1c 6.0\par             Decreased HDL/Elevated LDL\par            .\par            Taking methimazole 5 mg tablets.\par            Because of lowish TSH on 8 tablets a week, at last visit in January, dose incresed to 9 tablets a week.\par            .\par            Recent labs show TSH in normal range.\par            .\par            Impression: Hyperthyroidism well controlled on methimazole 5 mg tablets, 9 tablets a week. Has not gone into remission despite treatment with methimazole in early 2012 and thus he is not likely to go into a remission. He is awasre that his family history, elevted BMI, elevated LDL, lowe HDL are all cardiac risk factors and will discuss further with Dr. Lilly. \par            .\par            Plan: Same Rx. Formal 2 hour OGTT. \par            .\par            .\par            ==\par            .\par            January 30, 2017\par            .\par            PCP: Dr. Lilly fax [phone]\par            .\par            CC: Hyperthyroid\par             12/19/2011 -diagnosis (RAIU 46 %, TSI and TBII elevated)\par             u/s at MRI 11/2012 and 2/2013: simple goiter\par             1/2012 - started methimazole\par             Elevated BMI\par            .\par             Smoker\par            .\par            Last visit TSH low on methimazole 5 mg x 8 tabs/week so\par            dose inc to 5 mg x 9 tabs/week and\par            1/26/2017 TFTs show TSH 1.3\par            \par            HDL 31 \par            .\par            Impression: Thyroi controlled on 9 tabls methimazole a week.\par            .\par            Plan: methiamzole 5 mg x 9 tabs aweek.\par            ROV 3 months. - \par            .\par            ==\par            .\par            October 18, 2016\par            .\par            PCP: Dr. Lilly fax [phone]\par            .\par            CC: Hyperthyroid\par             12/19/2011 -diagnosis (RAIU 46 %, TSI and TBII elevated)\par             u/s at MRI 11/2012 and 2/2013: simple goiter\par             1/2012 - started methimazole\par             Elevated BMI\par            .\par             Smoker\par            .\par            Doing well on methimazole 5 mg, two on Sunday.\par            TSH is slightly low.\par            .\par            Plan: methimazole 5 mg 8 a week to 9 a week.\par            ROV -\par            .\par            ==\par            .\par            August 1, 2016\par            .\par            PCP: Dr. Lilly fax [phone]\par            .\par            CC: Hyperthyroid\par             12/19/2011 -diagnosis (RAIU 46 %, TSI and TBII elevated)\par             u/s at MRI 11/2012 and 2/2013: simple goiter\par             1/2012 - started methimazole\par             Elevated BMI\par            .\par            52 yo returns for hyperthyroidism due to Graves' disease, diagnosed in December of 2011 and started on methimazole in January of 2012. \par            He got dose of methimazole down to 2.5 mg alternating with 5 mg, but then TFTs went back up. \par            Last here in May and is on methimazole 5 mg daily.\par            Most recent blood tests ([facility]) on\par            7/29/2016 included\par            TSH 0.25 ***\par            T3 112\par            T4 8. \par            .\par            He feels well.\par            .\par            Impression: Not going into remission and not likely to. He is well aware of this, but prefers to stay on methimazole. He understands the risks of methimazole and the risks of thyroid overactivity. He understands that I-131 and surgery are definitive options to treat the thyroid overactivity.\par            .\par            Plan: As per his wishes, continue methimazole 5 mg daily, but take two pills on Sundays. Updated TFTs, LFTs, CBC within 8 weeks. Thank you. -jh\par            .\par            ==\par            .\par            May 10, 2016\par            .\par            PCP: Dr. Lilly fax [phone]\par            .\par            CC: Hyperthyroid\par             12/19/2011 -diagnosis\par             1/2012 - started methimazole\par            .Feels well on methimazole 5 mg daily.\par            .\par            Impression: Not going into remission.\par            Not interested in I-131 or surgery.\par            Aware of risks of methimazole\par            Suppressed TSH indicates he is still mildly hypethyroid. \par            .\par            Plan: Updated TFTs before next visit in July. \par            .\par            ==\par            .\par            January 27, 2016\par            .\par            PCP: Dr. Lilly fax [phone]\par            .\par            CC: Hyperthyroid\par             12/19/2011 -diagnosis\par             1/2012 - started methimazole\par            .\par            He has been noting occasional palpitations,\par            occasional shakiness.\par            .\par            Plan: Increase methimazole to 5 mg daily for now and repeat TFTs in 10 weeks. He is not yet interested in I-131\par            ROV in 11 weeks. -jh\par            .\par            ==\par            .\par            July 24, 2015\par            .\par            PCP: Dr. Lilly fax [phone]\par            .\par            CC: Hyperthyroid\par             12/19/2011 -diagnosis\par             1/2012 - started methimazole\par            .\par             Remains on methimazole 5 mg daily.\par            TFTs in good range.\par            Plan: Decrease methimazole to 2.5 mg alternating with 5 mg and\par            ROV after next TFTs in a few months\par            .\par            ==\par            .\par            April 3, 2015\par            .\par            PCP: Dr. Lilly fax [phone]\par            .\par            CC: Hyperthyroid\par             12/19/2011 -diagnosis\par             1/2012 - started methimazole\par            .\par            Today he reports he feels well \par            .\par            Imp: On methimazole 10 alt with 5.\par            .\par            Plan: 5 mg daily.\par            ROV in July.\par            .\par            ==\par            Dec 19, 2014\par            PCP: Dr. Lilly fax [phone]\par            CC: Hyperthyroid\par             12/19/2011 -diagnosis\par             1/2012 - started methimazole\par            .\par            Currently taking methimazole 10 mg alt with 5 mg last vist, dec from\par            10 mg daily.\par            Recenr TSI elev at 250 (< 140%)\par            T4 7.9 TSH 1.9\par            .\par            Impression: On methimazole for almost 3 years - not in remission but hyperthyroidism is controlled on 10 mg al with 5. TSI still elevated, also against chances of remission. \par            .\par            Plan: I-131 offered and declined. Same Rx. andn ROV in 3 1/2 months after labs.\par            .\par            ===\par            Sept 19, 2014\par            PCP: Dr. Lilly\par            CC: Hyperthyroid\par             12/19/2011 - Thyroid scan 46% update\par             TSI and TBII both elevated.\par             1/2012 - started methimazole\par            .\par            Currently on methimazole 10 mg daily. \par            In Feb 2014, TSH suppressed on 5 mg/day.\par            .\par            Recent TFTs are within normal (on 10 mg daily.\par            Sonogram Nov 2012 - normal.\par            .\par            Impression: As he did not go into a remission after two years of methimazole, the probability of a long term remission at this point is much decreased. However, there is room to lower the dose of methimazole from 10 mg daily.\par            .\par            Plan: Decrease to 10 mg alt with 5 mg, repeat labs in Nov and ROV Dec.\par

## 2020-11-20 ENCOUNTER — APPOINTMENT (OUTPATIENT)
Dept: ENDOCRINOLOGY | Facility: CLINIC | Age: 55
End: 2020-11-20
Payer: MEDICAID

## 2020-11-20 ENCOUNTER — LABORATORY RESULT (OUTPATIENT)
Age: 55
End: 2020-11-20

## 2020-11-20 VITALS
HEIGHT: 71 IN | DIASTOLIC BLOOD PRESSURE: 80 MMHG | WEIGHT: 225 LBS | OXYGEN SATURATION: 97 % | SYSTOLIC BLOOD PRESSURE: 138 MMHG | BODY MASS INDEX: 31.5 KG/M2 | HEART RATE: 97 BPM

## 2020-11-20 DIAGNOSIS — E11.9 TYPE 2 DIABETES MELLITUS W/OUT COMPLICATIONS: ICD-10-CM

## 2020-11-20 LAB
ALBUMIN SERPL ELPH-MCNC: 4.4 G/DL
ALP BLD-CCNC: 131 U/L
ALT SERPL-CCNC: 31 U/L
ANION GAP SERPL CALC-SCNC: 10 MMOL/L
AST SERPL-CCNC: 15 U/L
BASOPHILS # BLD AUTO: 0.07 K/UL
BASOPHILS NFR BLD AUTO: 0.8 %
BILIRUB DIRECT SERPL-MCNC: 0.1 MG/DL
BILIRUB INDIRECT SERPL-MCNC: 0.2 MG/DL
BILIRUB SERPL-MCNC: 0.3 MG/DL
BUN SERPL-MCNC: 14 MG/DL
CALCIUM SERPL-MCNC: 10 MG/DL
CHLORIDE SERPL-SCNC: 103 MMOL/L
CHOLEST SERPL-MCNC: 164 MG/DL
CHOLEST/HDLC SERPL: 4.3 RATIO
CO2 SERPL-SCNC: 27 MMOL/L
CREAT SERPL-MCNC: 0.98 MG/DL
EOSINOPHIL # BLD AUTO: 0.35 K/UL
EOSINOPHIL NFR BLD AUTO: 3.8 %
FRUCTOSAMINE SERPL-MCNC: 208 UMOL/L
GLUCOSE SERPL-MCNC: 121 MG/DL
HCT VFR BLD CALC: 48.8 %
HDLC SERPL-MCNC: 38 MG/DL
HGB BLD-MCNC: 15.3 G/DL
IMM GRANULOCYTES NFR BLD AUTO: 0.2 %
LDLC SERPL CALC-MCNC: 103 MG/DL
LYMPHOCYTES # BLD AUTO: 2.71 K/UL
LYMPHOCYTES NFR BLD AUTO: 29.7 %
MAN DIFF?: NORMAL
MCHC RBC-ENTMCNC: 28.4 PG
MCHC RBC-ENTMCNC: 31.4 GM/DL
MCV RBC AUTO: 90.5 FL
MONOCYTES # BLD AUTO: 0.72 K/UL
MONOCYTES NFR BLD AUTO: 7.9 %
NEUTROPHILS # BLD AUTO: 5.25 K/UL
NEUTROPHILS NFR BLD AUTO: 57.6 %
PLATELET # BLD AUTO: 215 K/UL
POTASSIUM SERPL-SCNC: 4.9 MMOL/L
PROT SERPL-MCNC: 7.1 G/DL
RBC # BLD: 5.39 M/UL
RBC # FLD: 13.6 %
SODIUM SERPL-SCNC: 140 MMOL/L
T3 SERPL-MCNC: 222 NG/DL
T4 SERPL-MCNC: 13 UG/DL
TRIGL SERPL-MCNC: 116 MG/DL
TSH RECEPTOR AB: 10.4 IU/L
TSH SERPL-ACNC: <0.01 UIU/ML
TSI ACT/NOR SER: 13.8 IU/L
WBC # FLD AUTO: 9.12 K/UL

## 2020-11-20 PROCEDURE — 99214 OFFICE O/P EST MOD 30 MIN: CPT | Mod: 25

## 2020-11-20 PROCEDURE — 36415 COLL VENOUS BLD VENIPUNCTURE: CPT

## 2020-11-20 NOTE — HISTORY OF PRESENT ILLNESS
[FreeTextEntry1] : Nov 20, 2020     in person\par \par PCP: Dr. Lilly fax 6 \par            .\par            CC: Hyperthyroid  12/19/2011 -diagnosis (RAIU 46 %, TSI and TBII elevated)\par             u/s at MRI 11/2012 and 2/2013: simple goiter\par               1/2012 - started methimazole\par             Elevated BMI (but losing weight)\par \par             Elevated PC BS \par            .\par          \par Has methimazole 5 mg tablets, x 4 a day (total of 20 mg).    Ran out a few days ago.\par Sept 4, 2020 T3   222 ()    T4  13    TSH undetectable  \par \par Examination: \par Constitutional:  Alert, well nourished, healthy appearance, no acute distress \par Eyes:  No proptosis, no lid lag\par Thyroid:\par Pulmonary:  No respiratory distress, no accessory muscle use; normal rate and effort\par Cardiac:  Normal rate\par Vascular: \par Endocrine:  No stigmata of Cushing’s Syndrome\par Musculoskeletal:  Normal gait, no involuntary movements\par Neurology:  No tremors\par Affect/Mood/Psych:  Oriented x 3; normal affect, normal insight/judgement, normal mood \par .\par \par Impression: TBII elevated, TFTs eleevated despite methimazole.\par Plan:  advised I-131 or thyroid surgery.    He prefers the methimazole for now.  \par \par \par Sep 03, 2020     in person\par \par PCP: Dr. Lilly fax [phone]\par            .\par            CC: Hyperthyroid  12/19/2011 -diagnosis (RAIU 46 %, TSI and TBII elevated)\par             u/s at MRI 11/2012 and 2/2013: simple goiter\par               1/2012 - started methimazole\par             Elevated BMI (but losing weight)\par \par             Elevated PC BS \par            .\par            Taking methimazole 5 mg tablets, three daily.\par \par \par Mar 24, 2020\par \par PCP: Dr. Lilly fax [phone]\par            .\par            CC: Hyperthyroid  12/19/2011 -diagnosis (RAIU 46 %, TSI and TBII elevated)\par             u/s at MRI 11/2012 and 2/2013: simple goiter\par               1/2012 - started methimazole\par             Elevated BMI (but losing weight)\par \par             Elevated PC BS \par            .\par            Taking methimazole 5 mg tablets, three daily.\par \par Impression:  TSh suppressed, T4 and T3 both elevated as is TSI.\par He is not interested in I-131 or thyroid surgery.  In fact, it is challenging to convince to stay on the\par methimazole since he feels so well.  \par \par Plan:  Increase methimazole from 15 mg a day to 20 mg/day. \par \par \par \par Oct 02, 2019\par \par PCP: Dr. Lilly fax [phone]\par            .\par            CC: Hyperthyroid\par             12/19/2011 -diagnosis (RAIU 46 %, TSI and TBII elevated)\par             u/s at MRI 11/2012 and 2/2013: simple goiter\par  1/2012 - started methimazole\par             Elevated BMI (but losing weight)\par            .\par          Taking methimazole 5 mg, one daily alternating with two daily.\par \par Now taking methimazole two 5 mg daily to [place] [facility].  \par Went for labs yesterday....not back.\par \par Plan:  Same Rx and labs before January visit.  \par \par \par \par \par \par May 24, 2019\par    PCP: Dr. Lilly fax [phone]\par            .\par            CC: Hyperthyroid\par             12/19/2011 -diagnosis (RAIU 46 %, TSI and TBII elevated)\par             u/s at MRI 11/2012 and 2/2013: simple goiter\par  1/2012 - started methimazole\par             Elevated BMI (but losing weight)\par            .\par          Taking methimazole 5 mg, one daily alternating with two daily.\par \par \par Today I reminded him of the potential side effects of methimazole including rash, hives, arthritis, hepatitis, agranulocytosis and death.    He prefers to take methimazole rather than the know safe option of I-131 or even surgery.\par So,he will increase the methimazole to 5 mg, two tablets daily, repeat labs before next visit in August.  \par As he reports:    "I feel great".  \par \par \par January 15, 2019\par \par \par \par \par Taking methimazole 5 mg daily.\par Recent labs at [facility] show suppressed TSH.\par Plan:  Increase methiamzole to 5 mg tablets:  one on odd days, two on even days.\par Repeat labs at [facility] before next visit in April.  \par To [facility] in [place] for refills.  \par \par Previous notes from [employer] appended below.\par \par  Feb 1, 2018\par            .\par            PCP: Dr. Lilly fax [phone]\par            .\par            CC: Hyperthyroid\par             12/19/2011 -diagnosis (RAIU 46 %, TSI and TBII elevated)\par             u/s at MRI 11/2012 and 2/2013: simple goiter\par             1/2012 - started methimazole\par             Elevated BMI (but losing weight)\par            .\par            Taking methimazole 5 mg x 8 tabs a week.\par            1/27/2018\par            TSH 0.49\par             (<140)\par            .\par            Impression: Doing well.\par            Declines I-131\par            .\par            Pllan: Same Rx\par            Warned of potential side effects of methimazole. \par            .\par            .\par            ==\par            .\par            Oct 4, 2017\par            .\par            PCP: Dr. Lilly fax [phone]\par            .\par            CC: Hyperthyroid\par             12/19/2011 -diagnosis (RAIU 46 %, TSI and TBII elevated)\par             u/s at MRI 11/2012 and 2/2013: simple goiter\par             1/2012 - started methimazole\par             Elevated BMI (but losing weight)\par            .\par            Taking methimazole 5 mg x 9 tabs a week.\par            TFTs in good range.\par            .\par            OGTT completely normal.\par            .\par            Has been exercise and has lost weight. \par            .\par            Plan: Dec methimazole to 8 ills a week \par            ROV in January.\par            .\par            ==\par            .\par            June 21, 2017\par            .\par            PCP: Dr. Lilly fax [phone]\par            .\par            CC: Hyperthyroid\par             12/19/2011 -diagnosis (RAIU 46 %, TSI and TBII elevated)\par             u/s at MRI 11/2012 and 2/2013: simple goiter\par             1/2012 - started methimazole\par             Elevated BMI\par            .\par             Smoker\par             A1c 6.0\par             Decreased HDL/Elevated LDL\par            .\par            Taking methimazole 5 mg tablets.\par            Because of lowish TSH on 8 tablets a week, at last visit in January, dose incresed to 9 tablets a week.\par            .\par            Recent labs show TSH in normal range.\par            .\par            Impression: Hyperthyroidism well controlled on methimazole 5 mg tablets, 9 tablets a week. Has not gone into remission despite treatment with methimazole in early 2012 and thus he is not likely to go into a remission. He is awasre that his family history, elevted BMI, elevated LDL, lowe HDL are all cardiac risk factors and will discuss further with Dr. Lilly. \par            .\par            Plan: Same Rx. Formal 2 hour OGTT. \par            .\par            .\par            ==\par            .\par            January 30, 2017\par            .\par            PCP: Dr. Lilly fax [phone]\par            .\par            CC: Hyperthyroid\par             12/19/2011 -diagnosis (RAIU 46 %, TSI and TBII elevated)\par             u/s at MRI 11/2012 and 2/2013: simple goiter\par             1/2012 - started methimazole\par             Elevated BMI\par            .\par             Smoker\par            .\par            Last visit TSH low on methimazole 5 mg x 8 tabs/week so\par            dose inc to 5 mg x 9 tabs/week and\par            1/26/2017 TFTs show TSH 1.3\par            \par            HDL 31 \par            .\par            Impression: Thyroi controlled on 9 tabls methimazole a week.\par            .\par            Plan: methiamzole 5 mg x 9 tabs aweek.\par            ROV 3 months. - \par            .\par            ==\par            .\par            October 18, 2016\par            .\par            PCP: Dr. Lilly fax [phone]\par            .\par            CC: Hyperthyroid\par             12/19/2011 -diagnosis (RAIU 46 %, TSI and TBII elevated)\par             u/s at MRI 11/2012 and 2/2013: simple goiter\par             1/2012 - started methimazole\par             Elevated BMI\par            .\par             Smoker\par            .\par            Doing well on methimazole 5 mg, two on Sunday.\par            TSH is slightly low.\par            .\par            Plan: methimazole 5 mg 8 a week to 9 a week.\par            ROV -\par            .\par            ==\par            .\par            August 1, 2016\par            .\par            PCP: Dr. Lilly fax [phone]\par            .\par            CC: Hyperthyroid\par             12/19/2011 -diagnosis (RAIU 46 %, TSI and TBII elevated)\par             u/s at MRI 11/2012 and 2/2013: simple goiter\par             1/2012 - started methimazole\par             Elevated BMI\par            .\par            52 yo returns for hyperthyroidism due to Graves' disease, diagnosed in December of 2011 and started on methimazole in January of 2012. \par            He got dose of methimazole down to 2.5 mg alternating with 5 mg, but then TFTs went back up. \par            Last here in May and is on methimazole 5 mg daily.\par            Most recent blood tests ([facility]) on\par            7/29/2016 included\par            TSH 0.25 ***\par            T3 112\par            T4 8. \par            .\par            He feels well.\par            .\par            Impression: Not going into remission and not likely to. He is well aware of this, but prefers to stay on methimazole. He understands the risks of methimazole and the risks of thyroid overactivity. He understands that I-131 and surgery are definitive options to treat the thyroid overactivity.\par            .\par            Plan: As per his wishes, continue methimazole 5 mg daily, but take two pills on Sundays. Updated TFTs, LFTs, CBC within 8 weeks. Thank you. -jh\par            .\par            ==\par            .\par            May 10, 2016\par            .\par            PCP: Dr. Lilly fax [phone]\par            .\par            CC: Hyperthyroid\par             12/19/2011 -diagnosis\par             1/2012 - started methimazole\par            .Feels well on methimazole 5 mg daily.\par            .\par            Impression: Not going into remission.\par            Not interested in I-131 or surgery.\par            Aware of risks of methimazole\par            Suppressed TSH indicates he is still mildly hypethyroid. \par            .\par            Plan: Updated TFTs before next visit in July. \par            .\par            ==\par            .\par            January 27, 2016\par            .\par            PCP: Dr. Lilly fax [phone]\par            .\par            CC: Hyperthyroid\par             12/19/2011 -diagnosis\par             1/2012 - started methimazole\par            .\par            He has been noting occasional palpitations,\par            occasional shakiness.\par            .\par            Plan: Increase methimazole to 5 mg daily for now and repeat TFTs in 10 weeks. He is not yet interested in I-131\par            ROV in 11 weeks. -jh\par            .\par            ==\par            .\par            July 24, 2015\par            .\par            PCP: Dr. Lilly fax [phone]\par            .\par            CC: Hyperthyroid\par             12/19/2011 -diagnosis\par             1/2012 - started methimazole\par            .\par             Remains on methimazole 5 mg daily.\par            TFTs in good range.\par            Plan: Decrease methimazole to 2.5 mg alternating with 5 mg and\par            ROV after next TFTs in a few months\par            .\par            ==\par            .\par            April 3, 2015\par            .\par            PCP: Dr. Lilly fax [phone]\par            .\par            CC: Hyperthyroid\par             12/19/2011 -diagnosis\par             1/2012 - started methimazole\par            .\par            Today he reports he feels well \par            .\par            Imp: On methimazole 10 alt with 5.\par            .\par            Plan: 5 mg daily.\par            ROV in July.\par            .\par            ==\par            Dec 19, 2014\par            PCP: Dr. Lilly fax [phone]\par            CC: Hyperthyroid\par             12/19/2011 -diagnosis\par             1/2012 - started methimazole\par            .\par            Currently taking methimazole 10 mg alt with 5 mg last vist, dec from\par            10 mg daily.\par            Recenr TSI elev at 250 (< 140%)\par            T4 7.9 TSH 1.9\par            .\par            Impression: On methimazole for almost 3 years - not in remission but hyperthyroidism is controlled on 10 mg al with 5. TSI still elevated, also against chances of remission. \par            .\par            Plan: I-131 offered and declined. Same Rx. andn ROV in 3 1/2 months after labs.\par            .\par            ===\par            Sept 19, 2014\par            PCP: Dr. Lilly\par            CC: Hyperthyroid\par             12/19/2011 - Thyroid scan 46% update\par             TSI and TBII both elevated.\par             1/2012 - started methimazole\par            .\par            Currently on methimazole 10 mg daily. \par            In Feb 2014, TSH suppressed on 5 mg/day.\par            .\par            Recent TFTs are within normal (on 10 mg daily.\par            Sonogram Nov 2012 - normal.\par            .\par            Impression: As he did not go into a remission after two years of methimazole, the probability of a long term remission at this point is much decreased. However, there is room to lower the dose of methimazole from 10 mg daily.\par            .\par            Plan: Decrease to 10 mg alt with 5 mg, repeat labs in Nov and ROV Dec.\par

## 2021-02-24 ENCOUNTER — APPOINTMENT (OUTPATIENT)
Dept: ENDOCRINOLOGY | Facility: CLINIC | Age: 56
End: 2021-02-24
Payer: MEDICAID

## 2021-02-24 LAB
ALBUMIN SERPL ELPH-MCNC: 4.5 G/DL
ALP BLD-CCNC: 116 U/L
ALT SERPL-CCNC: 25 U/L
ANION GAP SERPL CALC-SCNC: 12 MMOL/L
APPEARANCE: CLEAR
AST SERPL-CCNC: 17 U/L
BILIRUB DIRECT SERPL-MCNC: 0.1 MG/DL
BILIRUB INDIRECT SERPL-MCNC: 0.2 MG/DL
BILIRUB SERPL-MCNC: 0.2 MG/DL
BILIRUBIN URINE: NEGATIVE
BLOOD URINE: NEGATIVE
BUN SERPL-MCNC: 16 MG/DL
CALCIUM SERPL-MCNC: 9.5 MG/DL
CHLORIDE SERPL-SCNC: 106 MMOL/L
CO2 SERPL-SCNC: 26 MMOL/L
COLOR: YELLOW
CREAT SERPL-MCNC: 1.08 MG/DL
CREAT SPEC-SCNC: 270 MG/DL
ESTIMATED AVERAGE GLUCOSE: 123 MG/DL
GLUCOSE QUALITATIVE U: NEGATIVE
GLUCOSE SERPL-MCNC: 100 MG/DL
HBA1C MFR BLD HPLC: 5.9 %
KETONES URINE: NORMAL
LEUKOCYTE ESTERASE URINE: NEGATIVE
MICROALBUMIN 24H UR DL<=1MG/L-MCNC: 2 MG/DL
MICROALBUMIN/CREAT 24H UR-RTO: 8 MG/G
NITRITE URINE: NEGATIVE
PH URINE: 6.5
POTASSIUM SERPL-SCNC: 4.8 MMOL/L
PROT SERPL-MCNC: 7 G/DL
PROTEIN URINE: ABNORMAL
SODIUM SERPL-SCNC: 143 MMOL/L
SPECIFIC GRAVITY URINE: 1.03
T3 SERPL-MCNC: 197 NG/DL
T4 SERPL-MCNC: 13 UG/DL
TSH SERPL-ACNC: <0.01 UIU/ML
UROBILINOGEN URINE: ABNORMAL

## 2021-02-24 PROCEDURE — 99214 OFFICE O/P EST MOD 30 MIN: CPT

## 2021-02-24 PROCEDURE — 99072 ADDL SUPL MATRL&STAF TM PHE: CPT

## 2021-02-24 NOTE — HISTORY OF PRESENT ILLNESS
[Home] : at home, [unfilled] , at the time of the visit. [Medical Office: ([facility])___] : at the medical office located in  [Verbal consent obtained from patient] : the patient, [unfilled] [FreeTextEntry1] : Feb 24, 2021     [place]  \par \par PCP: Dr. Lilly fax 0 \par            .\par            CC: Hyperthyroid  12/19/2011 -diagnosis (RAIU 46 %, TSI and TBII elevated)\par             u/s at MRI 11/2012 and 2/2013: simple goiter\par               1/2012 - started methimazole\par             Elevated BMI (but losing weight)\par \par             Elevated PC BS \par \par 57 yo  will be going to [facility] for today or tomorrow.\par Most recent labs ([facility]) from November 20 included\par glucose 100 mg/dl\par T4 13\par T3  187 \par TSH < 0.01\par A1c 5.9 %\par u/a  urobilinogen 3 (<2)\par \par            .\par Impression:  Hyperthyroid d/t Graves diseease.\par Plan:  Updated labs - he said he will go to [facility] in next day or so and then call me on weekend for resutls\par ROV by jerirnela Lopes\par          \par \par \par \par Nov 20, 2020     in person\par \par PCP: Dr. Lilly fax 4 \par            .\par            CC: Hyperthyroid  12/19/2011 -diagnosis (RAIU 46 %, TSI and TBII elevated)\par             u/s at MRI 11/2012 and 2/2013: simple goiter\par               1/2012 - started methimazole\par             Elevated BMI (but losing weight)\par \par             Elevated PC BS \par            .\par          \par Has methimazole 5 mg tablets, x 4 a day (total of 20 mg).    Ran out a few days ago.\par Sept 4, 2020 T3   222 ()    T4  13    TSH undetectable  \par \par Examination: \par Constitutional:  Alert, well nourished, healthy appearance, no acute distress \par Eyes:  No proptosis, no lid lag\par Thyroid:\par Pulmonary:  No respiratory distress, no accessory muscle use; normal rate and effort\par Cardiac:  Normal rate\par Vascular: \par Endocrine:  No stigmata of Cushing’s Syndrome\par Musculoskeletal:  Normal gait, no involuntary movements\par Neurology:  No tremors\par Affect/Mood/Psych:  Oriented x 3; normal affect, normal insight/judgement, normal mood \par .\par \par Impression: TBII elevated, TFTs eleevated despite methimazole.\par Plan:  advised I-131 or thyroid surgery.    He prefers the methimazole for now.  \par \par \par Sep 03, 2020     in person\par \par PCP: Dr. Lilly fax [phone]\par            .\par            CC: Hyperthyroid  12/19/2011 -diagnosis (RAIU 46 %, TSI and TBII elevated)\par             u/s at MRI 11/2012 and 2/2013: simple goiter\par               1/2012 - started methimazole\par             Elevated BMI (but losing weight)\par \par             Elevated PC BS \par            .\par            Taking methimazole 5 mg tablets, three daily.\par \par \par Mar 24, 2020\par \par PCP: Dr. Lilly fax [phone]\par            .\par            CC: Hyperthyroid  12/19/2011 -diagnosis (RAIU 46 %, TSI and TBII elevated)\par             u/s at MRI 11/2012 and 2/2013: simple goiter\par               1/2012 - started methimazole\par             Elevated BMI (but losing weight)\par \par             Elevated PC BS \par            .\par            Taking methimazole 5 mg tablets, three daily.\par \par Impression:  TSh suppressed, T4 and T3 both elevated as is TSI.\par He is not interested in I-131 or thyroid surgery.  In fact, it is challenging to convince to stay on the\par methimazole since he feels so well.  \par \par Plan:  Increase methimazole from 15 mg a day to 20 mg/day. \par \par \par \par Oct 02, 2019\par \par PCP: Dr. Lilly fax [phone]\par            .\par            CC: Hyperthyroid\par             12/19/2011 -diagnosis (RAIU 46 %, TSI and TBII elevated)\par             u/s at MRI 11/2012 and 2/2013: simple goiter\par  1/2012 - started methimazole\par             Elevated BMI (but losing weight)\par            .\par          Taking methimazole 5 mg, one daily alternating with two daily.\par \par Now taking methimazole two 5 mg daily to [place].  \par Went for labs yesterday....not back.\par \par Plan:  Same Rx and labs before January visit.  \par \par \par \par \par \par May 24, 2019\par    PCP: Dr. Lilly fax [phone]\par            .\par            CC: Hyperthyroid\par             12/19/2011 -diagnosis (RAIU 46 %, TSI and TBII elevated)\par             u/s at MRI 11/2012 and 2/2013: simple goiter\par  1/2012 - started methimazole\par             Elevated BMI (but losing weight)\par            .\par          Taking methimazole 5 mg, one daily alternating with two daily.\par \par \par Today I reminded him of the potential side effects of methimazole including rash, hives, arthritis, hepatitis, agranulocytosis and death.    He prefers to take methimazole rather than the know safe option of I-131 or even surgery.\par So,he will increase the methimazole to 5 mg, two tablets daily, repeat labs before next visit in August.  \par As he reports:    "I feel great".  \par \par \par January 15, 2019\par \par \par \par \par Taking methimazole 5 mg daily.\par Recent labs at [facility] show suppressed TSH.\par Plan:  Increase methiamzole to 5 mg tablets:  one on odd days, two on even days.\par Repeat labs at [facility] before next visit in April.  \par To [facility] in [place] for refills.  \par \par Previous notes from [employer] appended below.\par \par  Feb 1, 2018\par            .\par            PCP: Dr. Lilly fax [phone]\par            .\par            CC: Hyperthyroid\par             12/19/2011 -diagnosis (RAIU 46 %, TSI and TBII elevated)\par             u/s at MRI 11/2012 and 2/2013: simple goiter\par             1/2012 - started methimazole\par             Elevated BMI (but losing weight)\par            .\par            Taking methimazole 5 mg x 8 tabs a week.\par            1/27/2018\par            TSH 0.49\par             (<140)\par            .\par            Impression: Doing well.\par            Declines I-131\par            .\par            Pllan: Same Rx\par            Warned of potential side effects of methimazole. \par            .\par            .\par            ==\par            .\par            Oct 4, 2017\par            .\par            PCP: Dr. Lilly fax [phone]\par            .\par            CC: Hyperthyroid\par             12/19/2011 -diagnosis (RAIU 46 %, TSI and TBII elevated)\par             u/s at MRI 11/2012 and 2/2013: simple goiter\par             1/2012 - started methimazole\par             Elevated BMI (but losing weight)\par            .\par            Taking methimazole 5 mg x 9 tabs a week.\par            TFTs in good range.\par            .\par            OGTT completely normal.\par            .\par            Has been exercise and has lost weight. \par            .\par            Plan: Dec methimazole to 8 ills a week \par            ROV in January.\par            .\par            ==\par            .\par            June 21, 2017\par            .\par            PCP: Dr. Lilly fax [phone]\par            .\par            CC: Hyperthyroid\par             12/19/2011 -diagnosis (RAIU 46 %, TSI and TBII elevated)\par             u/s at MRI 11/2012 and 2/2013: simple goiter\par             1/2012 - started methimazole\par             Elevated BMI\par            .\par             Smoker\par             A1c 6.0\par             Decreased HDL/Elevated LDL\par            .\par            Taking methimazole 5 mg tablets.\par            Because of lowish TSH on 8 tablets a week, at last visit in January, dose incresed to 9 tablets a week.\par            .\par            Recent labs show TSH in normal range.\par            .\par            Impression: Hyperthyroidism well controlled on methimazole 5 mg tablets, 9 tablets a week. Has not gone into remission despite treatment with methimazole in early 2012 and thus he is not likely to go into a remission. He is awasre that his family history, elevted BMI, elevated LDL, lowe HDL are all cardiac risk factors and will discuss further with Dr. Lilly. \par            .\par            Plan: Same Rx. Formal 2 hour OGTT. \par            .\par            .\par            ==\par            .\par            January 30, 2017\par            .\par            PCP: Dr. Lilly fax [phone]\par            .\par            CC: Hyperthyroid\par             12/19/2011 -diagnosis (RAIU 46 %, TSI and TBII elevated)\par             u/s at MRI 11/2012 and 2/2013: simple goiter\par             1/2012 - started methimazole\par             Elevated BMI\par            .\par             Smoker\par            .\par            Last visit TSH low on methimazole 5 mg x 8 tabs/week so\par            dose inc to 5 mg x 9 tabs/week and\par            1/26/2017 TFTs show TSH 1.3\par            \par            HDL 31 \par            .\par            Impression: Thyroi controlled on 9 tabls methimazole a week.\par            .\par            Plan: methiamzole 5 mg x 9 tabs aweek.\par            ROV 3 months. - \par            .\par            ==\par            .\par            October 18, 2016\par            .\par            PCP: Dr. Lilly fax [phone]\par            .\par            CC: Hyperthyroid\par             12/19/2011 -diagnosis (RAIU 46 %, TSI and TBII elevated)\par             u/s at MRI 11/2012 and 2/2013: simple goiter\par             1/2012 - started methimazole\par             Elevated BMI\par            .\par             Smoker\par            .\par            Doing well on methimazole 5 mg, two on Sunday.\par            TSH is slightly low.\par            .\par            Plan: methimazole 5 mg 8 a week to 9 a week.\par            ROV -\par            .\par            ==\par            .\par            August 1, 2016\par            .\par            PCP: Dr. Lilly fax [phone]\par            .\par            CC: Hyperthyroid\par             12/19/2011 -diagnosis (RAIU 46 %, TSI and TBII elevated)\par             u/s at MRI 11/2012 and 2/2013: simple goiter\par             1/2012 - started methimazole\par             Elevated BMI\par            .\par            50 yo returns for hyperthyroidism due to Graves' disease, diagnosed in December of 2011 and started on methimazole in January of 2012. \par            He got dose of methimazole down to 2.5 mg alternating with 5 mg, but then TFTs went back up. \par            Last here in May and is on methimazole 5 mg daily.\par            Most recent blood tests ([facility]) on\par            7/29/2016 included\par            TSH 0.25 ***\par            T3 112\par            T4 8. \par            .\par            He feels well.\par            .\par            Impression: Not going into remission and not likely to. He is well aware of this, but prefers to stay on methimazole. He understands the risks of methimazole and the risks of thyroid overactivity. He understands that I-131 and surgery are definitive options to treat the thyroid overactivity.\par            .\par            Plan: As per his wishes, continue methimazole 5 mg daily, but take two pills on Sundays. Updated TFTs, LFTs, CBC within 8 weeks. Thank you. -jh\par            .\par            ==\par            .\par            May 10, 2016\par            .\par            PCP: Dr. Lilly fax [phone]\par            .\par            CC: Hyperthyroid\par             12/19/2011 -diagnosis\par             1/2012 - started methimazole\par            .Feels well on methimazole 5 mg daily.\par            .\par            Impression: Not going into remission.\par            Not interested in I-131 or surgery.\par            Aware of risks of methimazole\par            Suppressed TSH indicates he is still mildly hypethyroid. \par            .\par            Plan: Updated TFTs before next visit in July. \par            .\par            ==\par            .\par            January 27, 2016\par            .\par            PCP: Dr. Lilly fax 1 [phone]\par            .\par            CC: Hyperthyroid\par             12/19/2011 -diagnosis\par             1/2012 - started methimazole\par            .\par            He has been noting occasional palpitations,\par            occasional shakiness.\par            .\par            Plan: Increase methimazole to 5 mg daily for now and repeat TFTs in 10 weeks. He is not yet interested in I-131\par            ROV in 11 weeks. -jh\par            .\par            ==\par            .\par            July 24, 2015\par            .\par            PCP: Dr. Lilly fax [phone]\par            .\par            CC: Hyperthyroid\par             12/19/2011 -diagnosis\par             1/2012 - started methimazole\par            .\par             Remains on methimazole 5 mg daily.\par            TFTs in good range.\par            Plan: Decrease methimazole to 2.5 mg alternating with 5 mg and\par            ROV after next TFTs in a few months\par            .\par            ==\par            .\par            April 3, 2015\par            .\par            PCP: Dr. Lilly fax [phone]\par            .\par            CC: Hyperthyroid\par             12/19/2011 -diagnosis\par             1/2012 - started methimazole\par            .\par            Today he reports he feels well \par            .\par            Imp: On methimazole 10 alt with 5.\par            .\par            Plan: 5 mg daily.\par            ROV in July.\par            .\par            ==\par            Dec 19, 2014\par            PCP: Dr. Lilly fax [phone]\par            CC: Hyperthyroid\par             12/19/2011 -diagnosis\par             1/2012 - started methimazole\par            .\par            Currently taking methimazole 10 mg alt with 5 mg last vist, dec from\par            10 mg daily.\par            Recenr TSI elev at 250 (< 140%)\par            T4 7.9 TSH 1.9\par            .\par            Impression: On methimazole for almost 3 years - not in remission but hyperthyroidism is controlled on 10 mg al with 5. TSI still elevated, also against chances of remission. \par            .\par            Plan: I-131 offered and declined. Same Rx. andn ROV in 3 1/2 months after labs.\par            .\par            ===\par            Sept 19, 2014\par            PCP: Dr. Lilly\par            CC: Hyperthyroid\par             12/19/2011 - Thyroid scan 46% update\par             TSI and TBII both elevated.\par             1/2012 - started methimazole\par            .\par            Currently on methimazole 10 mg daily. \par            In Feb 2014, TSH suppressed on 5 mg/day.\par            .\par            Recent TFTs are within normal (on 10 mg daily.\par            Sonogram Nov 2012 - normal.\par            .\par            Impression: As he did not go into a remission after two years of methimazole, the probability of a long term remission at this point is much decreased. However, there is room to lower the dose of methimazole from 10 mg daily.\par            .\par            Plan: Decrease to 10 mg alt with 5 mg, repeat labs in Nov and ROV Dec.\par

## 2021-06-24 ENCOUNTER — NON-APPOINTMENT (OUTPATIENT)
Age: 56
End: 2021-06-24

## 2021-07-09 ENCOUNTER — APPOINTMENT (OUTPATIENT)
Dept: ENDOCRINOLOGY | Facility: CLINIC | Age: 56
End: 2021-07-09
Payer: MEDICAID

## 2021-07-09 PROCEDURE — 99213 OFFICE O/P EST LOW 20 MIN: CPT

## 2021-07-09 PROCEDURE — 99072 ADDL SUPL MATRL&STAF TM PHE: CPT

## 2021-07-09 NOTE — HISTORY OF PRESENT ILLNESS
[Home] : at home, [unfilled] , at the time of the visit. [Medical Office: ([facility])___] : at the medical office located in  [Verbal consent obtained from patient] : the patient, [unfilled] [FreeTextEntry1] : Jul 09, 2021    iPhone \par \par PCP: Dr. Lilly fax 0 \par            .\par            CC: Hyperthyroid  12/19/2011 -diagnosis (RAIU 46 %, TSI and TBII elevated)\par             u/s at MRI 11/2012 and 2/2013: simple goiter\par               1/2012 - started methimazole\par             Elevated BMI (but losing weight)\par \par             Elevated PC BS \par \par Has a problem with sensor in his care.\par Because of that, he could not come in toay and did not have a chance to stop by [facility] for \par lab tests but says he will go to [facility] tomorrow.  \par \par Last labs showed T4 18.\par He is supposed to see ENT for scratchy voice\par Went for US thyroid at [facility]:  "Heterogeneous thyroid with ill defined nodularity....No discrete nodules.  The appearance is similar to the prior study of February 2013..."\par \par Currently taking methimazole 10 mg x 3.\par Will go to [place], call me a few days later.\par He made appt to see me END of Nov.\par Today I indicated he also needs Nov 2.  \par \par \par Feb 24, 2021     iPhone  \par \par PCP: Dr. Lilly fax 3 \par            .\par            CC: Hyperthyroid  12/19/2011 -diagnosis (RAIU 46 %, TSI and TBII elevated)\par             u/s at MRI 11/2012 and 2/2013: simple goiter\par               1/2012 - started methimazole\par             Elevated BMI (but losing weight)\par \par             Elevated PC BS \par \par 55 yo  will be going to [facility] for today or tomorrow.\par Most recent labs ([facility]) from November 20 included\par glucose 100 mg/dl\par T4 13\par T3  187 \par TSH < 0.01\par A1c 5.9 %\par u/a  urobilinogen 3 (<2)\par \par            .\par Impression:  Hyperthyroid d/t Graves diseease.\par Plan:  Updated labs - he said he will go to [facility] in next day or so and then call me on weekend for resutls\par ROV by erarly May\par          \par \par \par \par Nov 20, 2020     in person\par \par PCP: Dr. Lilly fax 1 \par            .\par            CC: Hyperthyroid  12/19/2011 -diagnosis (RAIU 46 %, TSI and TBII elevated)\par             u/s at MRI 11/2012 and 2/2013: simple goiter\par               1/2012 - started methimazole\par             Elevated BMI (but losing weight)\par \par             Elevated PC BS \par            .\par          \par Has methimazole 5 mg tablets, x 4 a day (total of 20 mg).    Ran out a few days ago.\par Sept 4, 2020 T3   222 ()    T4  13    TSH undetectable  \par \par Examination: \par Constitutional:  Alert, well nourished, healthy appearance, no acute distress \par Eyes:  No proptosis, no lid lag\par Thyroid:\par Pulmonary:  No respiratory distress, no accessory muscle use; normal rate and effort\par Cardiac:  Normal rate\par Vascular: \par Endocrine:  No stigmata of Cushing’s Syndrome\par Musculoskeletal:  Normal gait, no involuntary movements\par Neurology:  No tremors\par Affect/Mood/Psych:  Oriented x 3; normal affect, normal insight/judgement, normal mood \par .\par \par Impression: TBII elevated, TFTs eleevated despite methimazole.\par Plan:  advised I-131 or thyroid surgery.    He prefers the methimazole for now.  \par \par \par Sep 03, 2020     in person\par \par PCP: Dr. Lilly fax [phone]\par            .\par            CC: Hyperthyroid  12/19/2011 -diagnosis (RAIU 46 %, TSI and TBII elevated)\par             u/s at MRI 11/2012 and 2/2013: simple goiter\par               1/2012 - started methimazole\par             Elevated BMI (but losing weight)\par \par             Elevated PC BS \par            .\par            Taking methimazole 5 mg tablets, three daily.\par \par \par Mar 24, 2020\par \par PCP: Dr. Lilly fax [phone]\par            .\par            CC: Hyperthyroid  12/19/2011 -diagnosis (RAIU 46 %, TSI and TBII elevated)\par             u/s at MRI 11/2012 and 2/2013: simple goiter\par               1/2012 - started methimazole\par             Elevated BMI (but losing weight)\par \par             Elevated PC BS \par            .\par            Taking methimazole 5 mg tablets, three daily.\par \par Impression:  TSh suppressed, T4 and T3 both elevated as is TSI.\par He is not interested in I-131 or thyroid surgery.  In fact, it is challenging to convince to stay on the\par methimazole since he feels so well.  \par \par Plan:  Increase methimazole from 15 mg a day to 20 mg/day. \par \par \par \par Oct 02, 2019\par \par PCP: Dr. Lilly fax 1 [phone]\par            .\par            CC: Hyperthyroid\par             12/19/2011 -diagnosis (RAIU 46 %, TSI and TBII elevated)\par             u/s at MRI 11/2012 and 2/2013: simple goiter\par  1/2012 - started methimazole\par             Elevated BMI (but losing weight)\par            .\par          Taking methimazole 5 mg, one daily alternating with two daily.\par \par Now taking methimazole two 5 mg daily to [place] [facility].  \par Went for labs yesterday....not back.\par \par Plan:  Same Rx and labs before January visit.  \par \par \par \par \par \par May 24, 2019\par    PCP: Dr. Lilly fax [phone]\par            .\par            CC: Hyperthyroid\par             12/19/2011 -diagnosis (RAIU 46 %, TSI and TBII elevated)\par             u/s at MRI 11/2012 and 2/2013: simple goiter\par  1/2012 - started methimazole\par             Elevated BMI (but losing weight)\par            .\par          Taking methimazole 5 mg, one daily alternating with two daily.\par \par \par Today I reminded him of the potential side effects of methimazole including rash, hives, arthritis, hepatitis, agranulocytosis and death.    He prefers to take methimazole rather than the know safe option of I-131 or even surgery.\par So,he will increase the methimazole to 5 mg, two tablets daily, repeat labs before next visit in August.  \par As he reports:    "I feel great".  \par \par \par January 15, 2019\par \par \par \par \par Taking methimazole 5 mg daily.\par Recent labs at [facility] show suppressed TSH.\par Plan:  Increase methiamzole to 5 mg tablets:  one on odd days, two on even days.\par Repeat labs at [facility] before next visit in April.  \par To [facility] in [place] for refills.  \par \par Previous notes from [employer] appended below.\par \par  Feb 1, 2018\par            .\par            PCP: Dr. Lilly fax [phone]\par            .\par            CC: Hyperthyroid\par             12/19/2011 -diagnosis (RAIU 46 %, TSI and TBII elevated)\par             u/s at MRI 11/2012 and 2/2013: simple goiter\par             1/2012 - started methimazole\par             Elevated BMI (but losing weight)\par            .\par            Taking methimazole 5 mg x 8 tabs a week.\par            1/27/2018\par            TSH 0.49\par             (<140)\par            .\par            Impression: Doing well.\par            Declines I-131\par            .\par            Pllan: Same Rx\par            Warned of potential side effects of methimazole. \par            .\par            .\par            ==\par            .\par            Oct 4, 2017\par            .\par            PCP: Dr. Lilly fax [phone]\par            .\par            CC: Hyperthyroid\par             12/19/2011 -diagnosis (RAIU 46 %, TSI and TBII elevated)\par             u/s at MRI 11/2012 and 2/2013: simple goiter\par             1/2012 - started methimazole\par             Elevated BMI (but losing weight)\par            .\par            Taking methimazole 5 mg x 9 tabs a week.\par            TFTs in good range.\par            .\par            OGTT completely normal.\par            .\par            Has been exercise and has lost weight. \par            .\par            Plan: Dec methimazole to 8 ills a week \par            ROV in January.\par            .\par            ==\par            .\par            June 21, 2017\par            .\par            PCP: Dr. Lilly fax 1 [phone]\par            .\par            CC: Hyperthyroid\par             12/19/2011 -diagnosis (RAIU 46 %, TSI and TBII elevated)\par             u/s at MRI 11/2012 and 2/2013: simple goiter\par             1/2012 - started methimazole\par             Elevated BMI\par            .\par             Smoker\par             A1c 6.0\par             Decreased HDL/Elevated LDL\par            .\par            Taking methimazole 5 mg tablets.\par            Because of lowish TSH on 8 tablets a week, at last visit in January, dose incresed to 9 tablets a week.\par            .\par            Recent labs show TSH in normal range.\par            .\par            Impression: Hyperthyroidism well controlled on methimazole 5 mg tablets, 9 tablets a week. Has not gone into remission despite treatment with methimazole in early 2012 and thus he is not likely to go into a remission. He is awasre that his family history, elevted BMI, elevated LDL, lowe HDL are all cardiac risk factors and will discuss further with Dr. Lilly. \par            .\par            Plan: Same Rx. Formal 2 hour OGTT. \par            .\par            .\par            ==\par            .\par            January 30, 2017\par            .\par            PCP: Dr. Lilly fax [phone]\par            .\par            CC: Hyperthyroid\par             12/19/2011 -diagnosis (RAIU 46 %, TSI and TBII elevated)\par             u/s at MRI 11/2012 and 2/2013: simple goiter\par             1/2012 - started methimazole\par             Elevated BMI\par            .\par             Smoker\par            .\par            Last visit TSH low on methimazole 5 mg x 8 tabs/week so\par            dose inc to 5 mg x 9 tabs/week and\par            1/26/2017 TFTs show TSH 1.3\par            \par            HDL 31 \par            .\par            Impression: Thyroi controlled on 9 tabls methimazole a week.\par            .\par            Plan: methiamzole 5 mg x 9 tabs aweek.\par            ROV 3 months. - \par            .\par            ==\par            .\par            October 18, 2016\par            .\par            PCP: Dr. Lilly fax 1 [phone]\par            .\par            CC: Hyperthyroid\par             12/19/2011 -diagnosis (RAIU 46 %, TSI and TBII elevated)\par             u/s at MRI 11/2012 and 2/2013: simple goiter\par             1/2012 - started methimazole\par             Elevated BMI\par            .\par             Smoker\par            .\par            Doing well on methimazole 5 mg, two on Sunday.\par            TSH is slightly low.\par            .\par            Plan: methimazole 5 mg 8 a week to 9 a week.\par            ROV -\par            .\par            ==\par            .\par            August 1, 2016\par            .\par            PCP: Dr. Lilly fax 1 [phone]\par            .\par            CC: Hyperthyroid\par             12/19/2011 -diagnosis (RAIU 46 %, TSI and TBII elevated)\par             u/s at MRI 11/2012 and 2/2013: simple goiter\par             1/2012 - started methimazole\par             Elevated BMI\par            .\par            52 yo returns for hyperthyroidism due to Graves' disease, diagnosed in December of 2011 and started on methimazole in January of 2012. \par            He got dose of methimazole down to 2.5 mg alternating with 5 mg, but then TFTs went back up. \par            Last here in May and is on methimazole 5 mg daily.\par            Most recent blood tests ([facility]) on\par            7/29/2016 included\par            TSH 0.25 ***\par            T3 112\par            T4 8. \par            .\par            He feels well.\par            .\par            Impression: Not going into remission and not likely to. He is well aware of this, but prefers to stay on methimazole. He understands the risks of methimazole and the risks of thyroid overactivity. He understands that I-131 and surgery are definitive options to treat the thyroid overactivity.\par            .\par            Plan: As per his wishes, continue methimazole 5 mg daily, but take two pills on Sundays. Updated TFTs, LFTs, CBC within 8 weeks. Thank you. -jh\par            .\par            ==\par            .\par            May 10, 2016\par            .\par            PCP: Dr. Lilly fax [phone]\par            .\par            CC: Hyperthyroid\par             12/19/2011 -diagnosis\par             1/2012 - started methimazole\par            .Feels well on methimazole 5 mg daily.\par            .\par            Impression: Not going into remission.\par            Not interested in I-131 or surgery.\par            Aware of risks of methimazole\par            Suppressed TSH indicates he is still mildly hypethyroid. \par            .\par            Plan: Updated TFTs before next visit in July. \par            .\par            ==\par            .\par            January 27, 2016\par            .\par            PCP: Dr. Lilly fax [phone]\par            .\par            CC: Hyperthyroid\par             12/19/2011 -diagnosis\par             1/2012 - started methimazole\par            .\par            He has been noting occasional palpitations,\par            occasional shakiness.\par            .\par            Plan: Increase methimazole to 5 mg daily for now and repeat TFTs in 10 weeks. He is not yet interested in I-131\par            ROV in 11 weeks. -jh\par            .\par            ==\par            .\par            July 24, 2015\par            .\par            PCP: Dr. Lilly fax [phone]\par            .\par            CC: Hyperthyroid\par             12/19/2011 -diagnosis\par             1/2012 - started methimazole\par            .\par             Remains on methimazole 5 mg daily.\par            TFTs in good range.\par            Plan: Decrease methimazole to 2.5 mg alternating with 5 mg and\par            ROV after next TFTs in a few months\par            .\par            ==\par            .\par            April 3, 2015\par            .\par            PCP: Dr. Lilly fax [phone]\par            .\par            CC: Hyperthyroid\par             12/19/2011 -diagnosis\par             1/2012 - started methimazole\par            .\par            Today he reports he feels well \par            .\par            Imp: On methimazole 10 alt with 5.\par            .\par            Plan: 5 mg daily.\par            ROV in July.\par            .\par            ==\par            Dec 19, 2014\par            PCP: Dr. Lilly fax [phone]\par            CC: Hyperthyroid\par             12/19/2011 -diagnosis\par             1/2012 - started methimazole\par            .\par            Currently taking methimazole 10 mg alt with 5 mg last vist, dec from\par            10 mg daily.\par            Recenr TSI elev at 250 (< 140%)\par            T4 7.9 TSH 1.9\par            .\par            Impression: On methimazole for almost 3 years - not in remission but hyperthyroidism is controlled on 10 mg al with 5. TSI still elevated, also against chances of remission. \par            .\par            Plan: I-131 offered and declined. Same Rx. andn ROV in 3 1/2 months after labs.\par            .\par            ===\par            Sept 19, 2014\par            PCP: Dr. Lilly\par            CC: Hyperthyroid\par             12/19/2011 - Thyroid scan 46% update\par             TSI and TBII both elevated.\par             1/2012 - started methimazole\par            .\par            Currently on methimazole 10 mg daily. \par            In Feb 2014, TSH suppressed on 5 mg/day.\par            .\par            Recent TFTs are within normal (on 10 mg daily.\par            Sonogram Nov 2012 - normal.\par            .\par            Impression: As he did not go into a remission after two years of methimazole, the probability of a long term remission at this point is much decreased. However, there is room to lower the dose of methimazole from 10 mg daily.\par            .\par            Plan: Decrease to 10 mg alt with 5 mg, repeat labs in Nov and ROV Dec.\par

## 2021-10-31 ENCOUNTER — NON-APPOINTMENT (OUTPATIENT)
Age: 56
End: 2021-10-31

## 2022-01-10 ENCOUNTER — APPOINTMENT (OUTPATIENT)
Dept: ENDOCRINOLOGY | Facility: CLINIC | Age: 57
End: 2022-01-10
Payer: MEDICAID

## 2022-01-10 PROCEDURE — 99214 OFFICE O/P EST MOD 30 MIN: CPT | Mod: 95

## 2022-01-10 NOTE — HISTORY OF PRESENT ILLNESS
[FreeTextEntry1] : Tu 10, 2022     iPhone\par \par PCP: Dr. Lilly fax 8 \par            .\par            CC: Hyperthyroid  12/19/2011 -diagnosis (RAIU 46 %, TSI and TBII elevated)\par             u/s at MRI 11/2012 and 2/2013: simple goiter\par               1/2012 - started methimazole\par             Elevated BMI (but losing weight)\par \par             Elevated PC BS \par             Lung cancer\par \par 58 yo called today to request [facility] form and said he would make appointment after that.   Wife told the  that he could not do a telephone visit because he was in the hospital.\par Did not add up:     So by 5:00 pm today we did a videochat.\par I had provided his records to Endocrine team when he was in [facility].\par He is currently taking methimazole 10 mg TID.\par \par Says he feels well.\par \par Plan:   He will ask his oncologist to fax update on current status.\par I have emailed [employer] for to Mr. Ulloa and he will call me one week later to discuss methimazole dose\par and I will again advise I-131.    \par \par \par \par 11/4/ 2021     \par \par Mr. Ulloa calls to report that after July visit, he saw ENT, Dr. Andres for scratchy throat\par then went for CT+ chest, told of mass, biopsy positive for small cell lung cancer and\par he is now seeing oncologist, Dr. Victorino Che at [facility] and will start chemotherapy this week and\par expects that to be followed by radiation (w/o surgery).\par He has no recent TFTs.\par Last T4 elevated.\par He has a [facility] form.\par Agrees to go to [employer] tomorrow and to call me a few days later.  -jh\par \par \par \par \par \par Jul 09, 2021    iPhone \par \par PCP: Dr. Lilly fax 3 \par            .\par            CC: Hyperthyroid  12/19/2011 -diagnosis (RAIU 46 %, TSI and TBII elevated)\par             u/s at MRI 11/2012 and 2/2013: simple goiter\par               1/2012 - started methimazole\par             Elevated BMI (but losing weight)\par \par             Elevated PC BS \par \par Has a problem with sensor in his care.\par Because of that, he could not come in toay and did not have a chance to stop by [facility] for \par lab tests but says he will go to [facility] tomorrow.  \par \par Last labs showed T4 18.\par He is supposed to see ENT for scratchy voice\par Went for US thyroid at [facility]:  "Heterogeneous thyroid with ill defined nodularity....No discrete nodules.  The appearance is similar to the prior study of February 2013..."\par \par Currently taking methimazole 10 mg x 3.\par Will go to [facility], call me a few days later.\par He made appt to see me END of Nov.\par Today I indicated he also needs Nov 2.  \par \par \par Feb 24, 2021     iPhone  \par \par PCP: Dr. Lilly fax 8 \par            .\par            CC: Hyperthyroid  12/19/2011 -diagnosis (RAIU 46 %, TSI and TBII elevated)\par             u/s at MRI 11/2012 and 2/2013: simple goiter\par               1/2012 - started methimazole\par             Elevated BMI (but losing weight)\par \par             Elevated PC BS \par \par 57 yo  will be going to [facility] for today or tomorrow.\par Most recent labs ([facility]) from November 20 included\par glucose 100 mg/dl\par T4 13\par T3  187 \par TSH < 0.01\par A1c 5.9 %\par u/a  urobilinogen 3 (<2)\par \par            .\par Impression:  Hyperthyroid d/t Graves diseease.\par Plan:  Updated labs - he said he will go to [facility] in next day or so and then call me on weekend for resutls\par ROV by erarly May\par          \par \par \par \par Nov 20, 2020     in person\par \par PCP: Dr. Lilly fax 4 \par            .\par            CC: Hyperthyroid  12/19/2011 -diagnosis (RAIU 46 %, TSI and TBII elevated)\par             u/s at MRI 11/2012 and 2/2013: simple goiter\par               1/2012 - started methimazole\par             Elevated BMI (but losing weight)\par \par             Elevated PC BS \par            .\par          \par Has methimazole 5 mg tablets, x 4 a day (total of 20 mg).    Ran out a few days ago.\par Sept 4, 2020 T3   222 ()    T4  13    TSH undetectable  \par \par Examination: \par Constitutional:  Alert, well nourished, healthy appearance, no acute distress \par Eyes:  No proptosis, no lid lag\par Thyroid:\par Pulmonary:  No respiratory distress, no accessory muscle use; normal rate and effort\par Cardiac:  Normal rate\par Vascular: \par Endocrine:  No stigmata of Cushing’s Syndrome\par Musculoskeletal:  Normal gait, no involuntary movements\par Neurology:  No tremors\par Affect/Mood/Psych:  Oriented x 3; normal affect, normal insight/judgement, normal mood \par .\par \par Impression: TBII elevated, TFTs eleevated despite methimazole.\par Plan:  advised I-131 or thyroid surgery.    He prefers the methimazole for now.  \par \par \par Sep 03, 2020     in person\par \par PCP: Dr. Lilly fax 1 [phone]\par            .\par            CC: Hyperthyroid  12/19/2011 -diagnosis (RAIU 46 %, TSI and TBII elevated)\par             u/s at MRI 11/2012 and 2/2013: simple goiter\par               1/2012 - started methimazole\par             Elevated BMI (but losing weight)\par \par             Elevated PC BS \par            .\par            Taking methimazole 5 mg tablets, three daily.\par \par \par Mar 24, 2020\par \par PCP: Dr. Lilly fax 1 [phone]\par            .\par            CC: Hyperthyroid  12/19/2011 -diagnosis (RAIU 46 %, TSI and TBII elevated)\par             u/s at MRI 11/2012 and 2/2013: simple goiter\par               1/2012 - started methimazole\par             Elevated BMI (but losing weight)\par \par             Elevated PC BS \par            .\par            Taking methimazole 5 mg tablets, three daily.\par \par Impression:  TSh suppressed, T4 and T3 both elevated as is TSI.\par He is not interested in I-131 or thyroid surgery.  In fact, it is challenging to convince to stay on the\par methimazole since he feels so well.  \par \par Plan:  Increase methimazole from 15 mg a day to 20 mg/day. \par \par \par \par Oct 02, 2019\par \par PCP: Dr. Lilly fax 1 [phone]\par            .\par            CC: Hyperthyroid\par             12/19/2011 -diagnosis (RAIU 46 %, TSI and TBII elevated)\par             u/s at MRI 11/2012 and 2/2013: simple goiter\par  1/2012 - started methimazole\par             Elevated BMI (but losing weight)\par            .\par          Taking methimazole 5 mg, one daily alternating with two daily.\par \par Now taking methimazole two 5 mg daily to [facility].  \par Went for labs yesterday....not back.\par \par Plan:  Same Rx and labs before January visit.  \par \par \par \par \par \par May 24, 2019\par    PCP: Dr. Lilly fax 1 [phone]\par            .\par            CC: Hyperthyroid\par             12/19/2011 -diagnosis (RAIU 46 %, TSI and TBII elevated)\par             u/s at MRI 11/2012 and 2/2013: simple goiter\par  1/2012 - started methimazole\par             Elevated BMI (but losing weight)\par            .\par          Taking methimazole 5 mg, one daily alternating with two daily.\par \par \par Today I reminded him of the potential side effects of methimazole including rash, hives, arthritis, hepatitis, agranulocytosis and death.    He prefers to take methimazole rather than the know safe option of I-131 or even surgery.\par So,he will increase the methimazole to 5 mg, two tablets daily, repeat labs before next visit in August.  \par As he reports:    "I feel great".  \par \par \par January 15, 2019\par \par \par \par \par Taking methimazole 5 mg daily.\par Recent labs at [facility] show suppressed TSH.\par Plan:  Increase methiamzole to 5 mg tablets:  one on odd days, two on even days.\par Repeat labs at [facility] before next visit in April.  \par To [facility] in [place] for refills.  \par \par Previous notes from [employer] appended below.\par \par  Feb 1, 2018\par            .\par            PCP: Dr. Lilly fax [phone]\par            .\par            CC: Hyperthyroid\par             12/19/2011 -diagnosis (RAIU 46 %, TSI and TBII elevated)\par             u/s at MRI 11/2012 and 2/2013: simple goiter\par             1/2012 - started methimazole\par             Elevated BMI (but losing weight)\par            .\par            Taking methimazole 5 mg x 8 tabs a week.\par            1/27/2018\par            TSH 0.49\par             (<140)\par            .\par            Impression: Doing well.\par            Declines I-131\par            .\par            Pllan: Same Rx\par            Warned of potential side effects of methimazole. \par            .\par            .\par            ==\par            .\par            Oct 4, 2017\par            .\par            PCP: Dr. Lilly fax 1 [phone]\par            .\par            CC: Hyperthyroid\par             12/19/2011 -diagnosis (RAIU 46 %, TSI and TBII elevated)\par             u/s at MRI 11/2012 and 2/2013: simple goiter\par             1/2012 - started methimazole\par             Elevated BMI (but losing weight)\par            .\par            Taking methimazole 5 mg x 9 tabs a week.\par            TFTs in good range.\par            .\par            OGTT completely normal.\par            .\par            Has been exercise and has lost weight. \par            .\par            Plan: Dec methimazole to 8 ills a week \par            ROV in January.\par            .\par            ==\par            .\par            June 21, 2017\par            .\par            PCP: Dr. Lilly fax 1 [phone]\par            .\par            CC: Hyperthyroid\par             12/19/2011 -diagnosis (RAIU 46 %, TSI and TBII elevated)\par             u/s at MRI 11/2012 and 2/2013: simple goiter\par             1/2012 - started methimazole\par             Elevated BMI\par            .\par             Smoker\par             A1c 6.0\par             Decreased HDL/Elevated LDL\par            .\par            Taking methimazole 5 mg tablets.\par            Because of lowish TSH on 8 tablets a week, at last visit in January, dose incresed to 9 tablets a week.\par            .\par            Recent labs show TSH in normal range.\par            .\par            Impression: Hyperthyroidism well controlled on methimazole 5 mg tablets, 9 tablets a week. Has not gone into remission despite treatment with methimazole in early 2012 and thus he is not likely to go into a remission. He is awasre that his family history, elevted BMI, elevated LDL, lowe HDL are all cardiac risk factors and will discuss further with Dr. Lilly. \par            .\par            Plan: Same Rx. Formal 2 hour OGTT. \par            .\par            .\par            ==\par            .\par            January 30, 2017\par            .\par            PCP: Dr. Lilly fax 1 [phone]\par            .\par            CC: Hyperthyroid\par             12/19/2011 -diagnosis (RAIU 46 %, TSI and TBII elevated)\par             u/s at MRI 11/2012 and 2/2013: simple goiter\par             1/2012 - started methimazole\par             Elevated BMI\par            .\par             Smoker\par            .\par            Last visit TSH low on methimazole 5 mg x 8 tabs/week so\par            dose inc to 5 mg x 9 tabs/week and\par            1/26/2017 TFTs show TSH 1.3\par            \par            HDL 31 \par            .\par            Impression: Thyroi controlled on 9 tabls methimazole a week.\par            .\par            Plan: methiamzole 5 mg x 9 tabs aweek.\par            ROV 3 months. - \par            .\par            ==\par            .\par            October 18, 2016\par            .\par            PCP: Dr. Lilly fax [phone]\par            .\par            CC: Hyperthyroid\par             12/19/2011 -diagnosis (RAIU 46 %, TSI and TBII elevated)\par             u/s at MRI 11/2012 and 2/2013: simple goiter\par             1/2012 - started methimazole\par             Elevated BMI\par            .\par             Smoker\par            .\par            Doing well on methimazole 5 mg, two on Sunday.\par            TSH is slightly low.\par            .\par            Plan: methimazole 5 mg 8 a week to 9 a week.\par            ROV -\par            .\par            ==\par            .\par            August 1, 2016\par            .\par            PCP: Dr. Lilly fax [phone]\par            .\par            CC: Hyperthyroid\par             12/19/2011 -diagnosis (RAIU 46 %, TSI and TBII elevated)\par             u/s at MRI 11/2012 and 2/2013: simple goiter\par             1/2012 - started methimazole\par             Elevated BMI\par            .\par            50 yo returns for hyperthyroidism due to Graves' disease, diagnosed in December of 2011 and started on methimazole in January of 2012. \par            He got dose of methimazole down to 2.5 mg alternating with 5 mg, but then TFTs went back up. \par            Last here in May and is on methimazole 5 mg daily.\par            Most recent blood tests ([facility]) on\par            7/29/2016 included\par            TSH 0.25 ***\par            T3 112\par            T4 8. \par            .\par            He feels well.\par            .\par            Impression: Not going into remission and not likely to. He is well aware of this, but prefers to stay on methimazole. He understands the risks of methimazole and the risks of thyroid overactivity. He understands that I-131 and surgery are definitive options to treat the thyroid overactivity.\par            .\par            Plan: As per his wishes, continue methimazole 5 mg daily, but take two pills on Sundays. Updated TFTs, LFTs, CBC within 8 weeks. Thank you. -jh\par            .\par            ==\par            .\par            May 10, 2016\par            .\par            PCP: Dr. Lilly fax 1 [phone]\par            .\par            CC: Hyperthyroid\par             12/19/2011 -diagnosis\par             1/2012 - started methimazole\par            .Feels well on methimazole 5 mg daily.\par            .\par            Impression: Not going into remission.\par            Not interested in I-131 or surgery.\par            Aware of risks of methimazole\par            Suppressed TSH indicates he is still mildly hypethyroid. \par            .\par            Plan: Updated TFTs before next visit in July. \par            .\par            ==\par            .\par            January 27, 2016\par            .\par            PCP: Dr. Lilly fax 1 [phone]\par            .\par            CC: Hyperthyroid\par             12/19/2011 -diagnosis\par             1/2012 - started methimazole\par            .\par            He has been noting occasional palpitations,\par            occasional shakiness.\par            .\par            Plan: Increase methimazole to 5 mg daily for now and repeat TFTs in 10 weeks. He is not yet interested in I-131\par            ROV in 11 weeks. -jh\par            .\par            ==\par            .\par            July 24, 2015\par            .\par            PCP: Dr. Lilly fax 1 [phone]\par            .\par            CC: Hyperthyroid\par             12/19/2011 -diagnosis\par             1/2012 - started methimazole\par            .\par             Remains on methimazole 5 mg daily.\par            TFTs in good range.\par            Plan: Decrease methimazole to 2.5 mg alternating with 5 mg and\par            ROV after next TFTs in a few months\par            .\par            ==\par            .\par            April 3, 2015\par            .\par            PCP: Dr. Lilly fax 1 [phone]\par            .\par            CC: Hyperthyroid\par             12/19/2011 -diagnosis\par             1/2012 - started methimazole\par            .\par            Today he reports he feels well \par            .\par            Imp: On methimazole 10 alt with 5.\par            .\par            Plan: 5 mg daily.\par            ROV in July.\par            .\par            ==\par            Dec 19, 2014\par            PCP: Dr. Lilly fax 1 [phone]\par            CC: Hyperthyroid\par             12/19/2011 -diagnosis\par             1/2012 - started methimazole\par            .\par            Currently taking methimazole 10 mg alt with 5 mg last vist, dec from\par            10 mg daily.\par            Recenr TSI elev at 250 (< 140%)\par            T4 7.9 TSH 1.9\par            .\par            Impression: On methimazole for almost 3 years - not in remission but hyperthyroidism is controlled on 10 mg al with 5. TSI still elevated, also against chances of remission. \par            .\par            Plan: I-131 offered and declined. Same Rx. andn ROV in 3 1/2 months after labs.\par            .\par            ===\par            Sept 19, 2014\par            PCP: Dr. Lilly\par            CC: Hyperthyroid\par             12/19/2011 - Thyroid scan 46% update\par             TSI and TBII both elevated.\par             1/2012 - started methimazole\par            .\par            Currently on methimazole 10 mg daily. \par            In Feb 2014, TSH suppressed on 5 mg/day.\par            .\par            Recent TFTs are within normal (on 10 mg daily.\par            Sonogram Nov 2012 - normal.\par            .\par            Impression: As he did not go into a remission after two years of methimazole, the probability of a long term remission at this point is much decreased. However, there is room to lower the dose of methimazole from 10 mg daily.\par            .\par            Plan: Decrease to 10 mg alt with 5 mg, repeat labs in Nov and ROV Dec.\par

## 2022-01-13 ENCOUNTER — NON-APPOINTMENT (OUTPATIENT)
Age: 57
End: 2022-01-13

## 2022-01-18 ENCOUNTER — NON-APPOINTMENT (OUTPATIENT)
Age: 57
End: 2022-01-18

## 2022-01-23 ENCOUNTER — NON-APPOINTMENT (OUTPATIENT)
Age: 57
End: 2022-01-23

## 2022-01-24 ENCOUNTER — APPOINTMENT (OUTPATIENT)
Dept: ENDOCRINOLOGY | Facility: CLINIC | Age: 57
End: 2022-01-24
Payer: MEDICAID

## 2022-01-24 PROCEDURE — 99214 OFFICE O/P EST MOD 30 MIN: CPT | Mod: 95

## 2022-01-24 NOTE — HISTORY OF PRESENT ILLNESS
[Home] : at home, [unfilled] , at the time of the visit. [Medical Office: ([facility])___] : at the medical office located in  [Verbal consent obtained from patient] : the patient, [unfilled] [FreeTextEntry1] : Jan 24, 2022     iPhone\par \par PCP: Dr. Lilly fax 4 \par           Onc:  Dr. Sahu [facility] p   fax [phone]         io      \par            .\par            CC: Hyperthyroid  12/19/2011 -diagnosis (RAIU 46 %, TSI and TBII elevated)\par             u/s at MRI 11/2012 and 2/2013: simple goiter\par               1/2012 - started methimazole\par             Elevated BMI (but losing weight)\par \par             Elevated PC BS \par             Lung cancer\par \par Taking methimazole 10 mg x 3 tabs a day \par Qest labs from 1/21/2022 include\par creatinine 0.64\par TSH 0.03\par T4 16.3\par T3 231\par WBC 2.5\par Hct 22.1\par polys 943\par lymphs 1270   \par plats 61\par \par Impression:  Tolerating the hyperthyroidism without symptoms.\par Taking methimazole 30 mg daily.\par Says he is going for PET-CT soon.\par \par Plan:  Same Rx for now.\par I will check with Dr. Love regarding his perspective on the CBC.  [done]\par ROV Feb 14 after labs.\par \par \par \par \par \par Tu 10, 2022     iPhone\par \par PCP: Dr. Lilly fax 9 \par            .\par            CC: Hyperthyroid  12/19/2011 -diagnosis (RAIU 46 %, TSI and TBII elevated)\par             u/s at MRI 11/2012 and 2/2013: simple goiter\par               1/2012 - started methimazole\par             Elevated BMI (but losing weight)\par \par             Elevated PC BS \par             Lung cancer\par \par 56 yo called today to request [facility] form and said he would make appointment after that.   Wife told the  that he could not do a telephone visit because he was in the hospital.\par Did not add up:     So by 5:00 pm today we did a videochat.\par I had provided his records to Endocrine team when he was in [facility].\par He is currently taking methimazole 10 mg TID.\par \par Says he feels well.\par \par Plan:   He will ask his oncologist to fax update on current status.\par I have emailed [facility] for to Mr. Ulloa and he will call me one week later to discuss methimazole dose\par and I will again advise I-131.    \par \par \par \par 11/4/ 2021     \par \par Mr. Ulloa calls to report that after July visit, he saw ENT, Dr. Andres for scratchy throat\par then went for CT+ chest, told of mass, biopsy positive for small cell lung cancer and\par he is now seeing oncologist, Dr. Victorino Che at [facility] and will start chemotherapy this week and\par expects that to be followed by radiation (w/o surgery).\par He has no recent TFTs.\par Last T4 elevated.\par He has a [facility] form.\par Agrees to go to [place] tomorrow and to call me a few days later.  -jh\par \par \par \par \par \par Jul 09, 2021    iPhone \par \par PCP: Dr. Lilly fax 8 \par            .\par            CC: Hyperthyroid  12/19/2011 -diagnosis (RAIU 46 %, TSI and TBII elevated)\par             u/s at MRI 11/2012 and 2/2013: simple goiter\par               1/2012 - started methimazole\par             Elevated BMI (but losing weight)\par \par             Elevated PC BS \par \par Has a problem with sensor in his care.\par Because of that, he could not come in toay and did not have a chance to stop by [facility] for \par lab tests but says he will go to [place] tomorrow.  \par \par Last labs showed T4 18.\par He is supposed to see ENT for scratchy voice\par Went for US thyroid at [facility]:  "Heterogeneous thyroid with ill defined nodularity....No discrete nodules.  The appearance is similar to the prior study of February 2013..."\par \par Currently taking methimazole 10 mg x 3.\par Will go to [place], call me a few days later.\par He made appt to see me END of Nov.\par Today I indicated he also needs Nov 2.  \par \par \par Feb 24, 2021     iPhone  \par \par PCP: Dr. Lilly fax 6 \par            .\par            CC: Hyperthyroid  12/19/2011 -diagnosis (RAIU 46 %, TSI and TBII elevated)\par             u/s at MRI 11/2012 and 2/2013: simple goiter\par               1/2012 - started methimazole\par             Elevated BMI (but losing weight)\par \par             Elevated PC BS \par \par 55 yo  will be going to [facility] for today or tomorrow.\par Most recent labs ([facility]) from November 20 included\par glucose 100 mg/dl\par T4 13\par T3  187 \par TSH < 0.01\par A1c 5.9 %\par u/a  urobilinogen 3 (<2)\par \par            .\par Impression:  Hyperthyroid d/t Graves diseease.\par Plan:  Updated labs - he said he will go to [facility] in next day or so and then call me on weekend for resutls\par ROV by erarly May\par          \par \par \par \par Nov 20, 2020     in person\par \par PCP: Dr. Lilly fax 2 \par            .\par            CC: Hyperthyroid  12/19/2011 -diagnosis (RAIU 46 %, TSI and TBII elevated)\par             u/s at MRI 11/2012 and 2/2013: simple goiter\par               1/2012 - started methimazole\par             Elevated BMI (but losing weight)\par \par             Elevated PC BS \par            .\par          \par Has methimazole 5 mg tablets, x 4 a day (total of 20 mg).    Ran out a few days ago.\par Sept 4, 2020 T3   222 ()    T4  13    TSH undetectable  \par \par Examination: \par Constitutional:  Alert, well nourished, healthy appearance, no acute distress \par Eyes:  No proptosis, no lid lag\par Thyroid:\par Pulmonary:  No respiratory distress, no accessory muscle use; normal rate and effort\par Cardiac:  Normal rate\par Vascular: \par Endocrine:  No stigmata of Cushing’s Syndrome\par Musculoskeletal:  Normal gait, no involuntary movements\par Neurology:  No tremors\par Affect/Mood/Psych:  Oriented x 3; normal affect, normal insight/judgement, normal mood \par .\par \par Impression: TBII elevated, TFTs eleevated despite methimazole.\par Plan:  advised I-131 or thyroid surgery.    He prefers the methimazole for now.  \par \par \par Sep 03, 2020     in person\par \par PCP: Dr. iLlly fax 1 [phone]\par            .\par            CC: Hyperthyroid  12/19/2011 -diagnosis (RAIU 46 %, TSI and TBII elevated)\par             u/s at MRI 11/2012 and 2/2013: simple goiter\par               1/2012 - started methimazole\par             Elevated BMI (but losing weight)\par \par             Elevated PC BS \par            .\par            Taking methimazole 5 mg tablets, three daily.\par \par \par Mar 24, 2020\par \par PCP: Dr. Lilly fax [phone]\par            .\par            CC: Hyperthyroid  12/19/2011 -diagnosis (RAIU 46 %, TSI and TBII elevated)\par             u/s at MRI 11/2012 and 2/2013: simple goiter\par               1/2012 - started methimazole\par             Elevated BMI (but losing weight)\par \par             Elevated PC BS \par            .\par            Taking methimazole 5 mg tablets, three daily.\par \par Impression:  TSh suppressed, T4 and T3 both elevated as is TSI.\par He is not interested in I-131 or thyroid surgery.  In fact, it is challenging to convince to stay on the\par methimazole since he feels so well.  \par \par Plan:  Increase methimazole from 15 mg a day to 20 mg/day. \par \par \par \par Oct 02, 2019\par \par PCP: Dr. Lilly fax [phone]\par            .\par            CC: Hyperthyroid\par             12/19/2011 -diagnosis (RAIU 46 %, TSI and TBII elevated)\par             u/s at MRI 11/2012 and 2/2013: simple goiter\par  1/2012 - started methimazole\par             Elevated BMI (but losing weight)\par            .\par          Taking methimazole 5 mg, one daily alternating with two daily.\par \par Now taking methimazole two 5 mg daily to [place] pharmacy.  \par Went for labs yesterday....not back.\par \par Plan:  Same Rx and labs before January visit.  \par \par \par \par \par \par May 24, 2019\par    PCP: Dr. Lilly fax [phone]\par            .\par            CC: Hyperthyroid\par             12/19/2011 -diagnosis (RAIU 46 %, TSI and TBII elevated)\par             u/s at MRI 11/2012 and 2/2013: simple goiter\par  1/2012 - started methimazole\par             Elevated BMI (but losing weight)\par            .\par          Taking methimazole 5 mg, one daily alternating with two daily.\par \par \par Today I reminded him of the potential side effects of methimazole including rash, hives, arthritis, hepatitis, agranulocytosis and death.    He prefers to take methimazole rather than the know safe option of I-131 or even surgery.\par So,he will increase the methimazole to 5 mg, two tablets daily, repeat labs before next visit in August.  \par As he reports:    "I feel great".  \par \par \par January 15, 2019\par \par \par \par \par Taking methimazole 5 mg daily.\par Recent labs at [facility] show suppressed TSH.\par Plan:  Increase methiamzole to 5 mg tablets:  one on odd days, two on even days.\par Repeat labs at [facility] before next visit in April.  \par To [facility] in [place] for refills.  \par \par Previous notes from [employer] appended below.\par \par  Feb 1, 2018\par            .\par            PCP: Dr. Lilly fax [phone]\par            .\par            CC: Hyperthyroid\par             12/19/2011 -diagnosis (RAIU 46 %, TSI and TBII elevated)\par             u/s at MRI 11/2012 and 2/2013: simple goiter\par             1/2012 - started methimazole\par             Elevated BMI (but losing weight)\par            .\par            Taking methimazole 5 mg x 8 tabs a week.\par            1/27/2018\par            TSH 0.49\par             (<140)\par            .\par            Impression: Doing well.\par            Declines I-131\par            .\par            Pllan: Same Rx\par            Warned of potential side effects of methimazole. \par            .\par            .\par            ==\par            .\par            Oct 4, 2017\par            .\par            PCP: Dr. Lilly fax [phone]\par            .\par            CC: Hyperthyroid\par             12/19/2011 -diagnosis (RAIU 46 %, TSI and TBII elevated)\par             u/s at MRI 11/2012 and 2/2013: simple goiter\par             1/2012 - started methimazole\par             Elevated BMI (but losing weight)\par            .\par            Taking methimazole 5 mg x 9 tabs a week.\par            TFTs in good range.\par            .\par            OGTT completely normal.\par            .\par            Has been exercise and has lost weight. \par            .\par            Plan: Dec methimazole to 8 ills a week \par            ROV in January.\par            .\par            ==\par            .\par            June 21, 2017\par            .\par            PCP: Dr. Lilly fax [phone]\par            .\par            CC: Hyperthyroid\par             12/19/2011 -diagnosis (RAIU 46 %, TSI and TBII elevated)\par             u/s at MRI 11/2012 and 2/2013: simple goiter\par             1/2012 - started methimazole\par             Elevated BMI\par            .\par             Smoker\par             A1c 6.0\par             Decreased HDL/Elevated LDL\par            .\par            Taking methimazole 5 mg tablets.\par            Because of lowish TSH on 8 tablets a week, at last visit in January, dose incresed to 9 tablets a week.\par            .\par            Recent labs show TSH in normal range.\par            .\par            Impression: Hyperthyroidism well controlled on methimazole 5 mg tablets, 9 tablets a week. Has not gone into remission despite treatment with methimazole in early 2012 and thus he is not likely to go into a remission. He is awasre that his family history, elevted BMI, elevated LDL, lowe HDL are all cardiac risk factors and will discuss further with Dr. Lilly. \par            .\par            Plan: Same Rx. Formal 2 hour OGTT. \par            .\par            .\par            ==\par            .\par            January 30, 2017\par            .\par            PCP: Dr. Lilly fax [phone]\par            .\par            CC: Hyperthyroid\par             12/19/2011 -diagnosis (RAIU 46 %, TSI and TBII elevated)\par             u/s at MRI 11/2012 and 2/2013: simple goiter\par             1/2012 - started methimazole\par             Elevated BMI\par            .\par             Smoker\par            .\par            Last visit TSH low on methimazole 5 mg x 8 tabs/week so\par            dose inc to 5 mg x 9 tabs/week and\par            1/26/2017 TFTs show TSH 1.3\par            \par            HDL 31 \par            .\par            Impression: Thyroi controlled on 9 tabls methimazole a week.\par            .\par            Plan: methiamzole 5 mg x 9 tabs aweek.\par            ROV 3 months. - \par            .\par            ==\par            .\par            October 18, 2016\par            .\par            PCP: Dr. Lilly fax [phone]\par            .\par            CC: Hyperthyroid\par             12/19/2011 -diagnosis (RAIU 46 %, TSI and TBII elevated)\par             u/s at MRI 11/2012 and 2/2013: simple goiter\par             1/2012 - started methimazole\par             Elevated BMI\par            .\par             Smoker\par            .\par            Doing well on methimazole 5 mg, two on Sunday.\par            TSH is slightly low.\par            .\par            Plan: methimazole 5 mg 8 a week to 9 a week.\par            ROV -\par            .\par            ==\par            .\par            August 1, 2016\par            .\par            PCP: Dr. Lilly fax [phone]\par            .\par            CC: Hyperthyroid\par             12/19/2011 -diagnosis (RAIU 46 %, TSI and TBII elevated)\par             u/s at MRI 11/2012 and 2/2013: simple goiter\par             1/2012 - started methimazole\par             Elevated BMI\par            .\par            50 yo returns for hyperthyroidism due to Graves' disease, diagnosed in December of 2011 and started on methimazole in January of 2012. \par            He got dose of methimazole down to 2.5 mg alternating with 5 mg, but then TFTs went back up. \par            Last here in May and is on methimazole 5 mg daily.\par            Most recent blood tests ([facility]) on\par            7/29/2016 included\par            TSH 0.25 ***\par            T3 112\par            T4 8. \par            .\par            He feels well.\par            .\par            Impression: Not going into remission and not likely to. He is well aware of this, but prefers to stay on methimazole. He understands the risks of methimazole and the risks of thyroid overactivity. He understands that I-131 and surgery are definitive options to treat the thyroid overactivity.\par            .\par            Plan: As per his wishes, continue methimazole 5 mg daily, but take two pills on Sundays. Updated TFTs, LFTs, CBC within 8 weeks. Thank you. -jh\par            .\par            ==\par            .\par            May 10, 2016\par            .\par            PCP: Dr. Lilly fax 1 [phone]\par            .\par            CC: Hyperthyroid\par             12/19/2011 -diagnosis\par             1/2012 - started methimazole\par            .Feels well on methimazole 5 mg daily.\par            .\par            Impression: Not going into remission.\par            Not interested in I-131 or surgery.\par            Aware of risks of methimazole\par            Suppressed TSH indicates he is still mildly hypethyroid. \par            .\par            Plan: Updated TFTs before next visit in July. \par            .\par            ==\par            .\par            January 27, 2016\par            .\par            PCP: Dr. Lilly fax [phone]\par            .\par            CC: Hyperthyroid\par             12/19/2011 -diagnosis\par             1/2012 - started methimazole\par            .\par            He has been noting occasional palpitations,\par            occasional shakiness.\par            .\par            Plan: Increase methimazole to 5 mg daily for now and repeat TFTs in 10 weeks. He is not yet interested in I-131\par            ROV in 11 weeks. -jh\par            .\par            ==\par            .\par            July 24, 2015\par            .\par            PCP: Dr. Lilly fax [phone]\par            .\par            CC: Hyperthyroid\par             12/19/2011 -diagnosis\par             1/2012 - started methimazole\par            .\par             Remains on methimazole 5 mg daily.\par            TFTs in good range.\par            Plan: Decrease methimazole to 2.5 mg alternating with 5 mg and\par            ROV after next TFTs in a few months\par            .\par            ==\par            .\par            April 3, 2015\par            .\par            PCP: Dr. Lilly fax [phone]\par            .\par            CC: Hyperthyroid\par             12/19/2011 -diagnosis\par             1/2012 - started methimazole\par            .\par            Today he reports he feels well \par            .\par            Imp: On methimazole 10 alt with 5.\par            .\par            Plan: 5 mg daily.\par            ROV in July.\par            .\par            ==\par            Dec 19, 2014\par            PCP: Dr. Lilly fax [phone]\par            CC: Hyperthyroid\par             12/19/2011 -diagnosis\par             1/2012 - started methimazole\par            .\par            Currently taking methimazole 10 mg alt with 5 mg last vist, dec from\par            10 mg daily.\par            Recenr TSI elev at 250 (< 140%)\par            T4 7.9 TSH 1.9\par            .\par            Impression: On methimazole for almost 3 years - not in remission but hyperthyroidism is controlled on 10 mg al with 5. TSI still elevated, also against chances of remission. \par            .\par            Plan: I-131 offered and declined. Same Rx. andn ROV in 3 1/2 months after labs.\par            .\par            ===\par            Sept 19, 2014\par            PCP: Dr. Lilly\par            CC: Hyperthyroid\par             12/19/2011 - Thyroid scan 46% update\par             TSI and TBII both elevated.\par             1/2012 - started methimazole\par            .\par            Currently on methimazole 10 mg daily. \par            In Feb 2014, TSH suppressed on 5 mg/day.\par            .\par            Recent TFTs are within normal (on 10 mg daily.\par            Sonogram Nov 2012 - normal.\par            .\par            Impression: As he did not go into a remission after two years of methimazole, the probability of a long term remission at this point is much decreased. However, there is room to lower the dose of methimazole from 10 mg daily.\par            .\par            Plan: Decrease to 10 mg alt with 5 mg, repeat labs in Nov and ROV Dec.\par

## 2022-02-14 ENCOUNTER — APPOINTMENT (OUTPATIENT)
Dept: ENDOCRINOLOGY | Facility: CLINIC | Age: 57
End: 2022-02-14
Payer: MEDICAID

## 2022-02-14 PROCEDURE — 99214 OFFICE O/P EST MOD 30 MIN: CPT | Mod: 95

## 2022-02-14 NOTE — HISTORY OF PRESENT ILLNESS
[Home] : at home, [unfilled] , at the time of the visit. [Medical Office: ([facility])___] : at the medical office located in  [Verbal consent obtained from patient] : the patient, [unfilled] [FreeTextEntry1] : Feb 14, 2022     [facility]\par \par PCP: Dr. Lilly fax 0 \par           Onc:  Dr. Luis Alberto Love p   fax [phone]              \par            .\par            CC: Hyperthyroid  12/19/2011 -diagnosis (RAIU 46 %, TSI and TBII elevated)\par             u/s at MRI 11/2012 and 2/2013: simple goiter\par               1/2012 - started methimazole\par             Elevated BMI (but losing weight)\par \par             Elevated PC BS \par             Lung cancer\par \par Taking methimazole 10 mg x 3 tabs a day \par [facility] labs from 1/21/2022 included\par creatinine 0.64\par TSH 0.03\par T4 16.3\par T3 231\par WBC 2.5\par Hct 22.1\par polys 943\par lymphs 1270   \par plats 61\par \par \par   2/10/2022  [facility] labs:\par \par BS 98\par creatinine 0.61\par A1c 4.3 \par albumin 3.2\par TSH < 0.01\par T4 18\par T3 401\par Hct 26.3\par B12 656\par folate 8.1 \par vit D 32 \par \par After 4 rounds of chemo, he was put on a holiday.\par Started on iron.\par \par Impression:  Mr. Ulloa rarely takes his thyroid medicaton because he feels well.\par Plan:  I strongly encouraged him to take the methimazole religiously and to have a follow up visit 3/7/2022.\par after updated labs.     \par \par \par \par Jan 24, 2022     [facility]\par \par PCP: Dr. Lilly fax 0 \par           Onc:  Dr. Luis Alberto Alexander p   fax [phone]         io      \par            .\par            CC: Hyperthyroid  12/19/2011 -diagnosis (RAIU 46 %, TSI and TBII elevated)\par             u/s at MRI 11/2012 and 2/2013: simple goiter\par               1/2012 - started methimazole\par             Elevated BMI (but losing weight)\par \par             Elevated PC BS \par             Lung cancer\par \par Taking methimazole 10 mg x 3 tabs a day \par [facility] labs from 1/21/2022 include\par creatinine 0.64\par TSH 0.03\par T4 16.3\par T3 231\par WBC 2.5\par Hct 22.1\par polys 943\par lymphs 1270   \par plats 61\par \par Impression:  Tolerating the hyperthyroidism without symptoms.\par Taking methimazole 30 mg daily.\par Says he is going for PET-CT soon.\par \par Plan:  Same Rx for now.\par I will check with Dr. Love regarding his perspective on the CBC.  [done]\par ROV Feb 14 after labs.\par \par \par \par \par \par Tu 10, 2022     iPhone\par \par PCP: Dr. Lilly fax 0 \par            .\par            CC: Hyperthyroid  12/19/2011 -diagnosis (RAIU 46 %, TSI and TBII elevated)\par             u/s at MRI 11/2012 and 2/2013: simple goiter\par               1/2012 - started methimazole\par             Elevated BMI (but losing weight)\par \par             Elevated PC BS \par             Lung cancer\par \par 58 yo called today to request [facility] form and said he would make appointment after that.   Wife told the  that he could not do a telephone visit because he was in the hospital.\par Did not add up:     So by 5:00 pm today we did a videochat.\par I had provided his records to Endocrine team when he was in [facility].\par He is currently taking methimazole 10 mg TID.\par \par Says he feels well.\par \par Plan:   He will ask his oncologist to fax update on current status.\par I have emailed [facility] for to Mr. Ulloa and he will call me one week later to discuss methimazole dose\par and I will again advise I-131.    \par \par \par \par 11/4/ 2021     \par \par Mr. Ulloa calls to report that after July visit, he saw ENT, Dr. Andres for scratchy throat\par then went for CT+ chest, told of mass, biopsy positive for small cell lung cancer and\par he is now seeing oncologist, Dr. Victorino Che at [facility] and will start chemotherapy this week and\par expects that to be followed by radiation (w/o surgery).\par He has no recent TFTs.\par Last T4 elevated.\par He has a [facility] form.\par Agrees to go to [facility] tomorrow and to call me a few days later.  -\par \par \par \par \par \par Jul 09, 2021    iPhone \par \par PCP: Dr. Lilly fax 1 [phone]\par            .\par            CC: Hyperthyroid  12/19/2011 -diagnosis (RAIU 46 %, TSI and TBII elevated)\par             u/s at MRI 11/2012 and 2/2013: simple goiter\par               1/2012 - started methimazole\par             Elevated BMI (but losing weight)\par \par             Elevated PC BS \par \par Has a problem with sensor in his care.\par Because of that, he could not come in toay and did not have a chance to stop by [facility] for \par lab tests but says he will go to [facility] tomorrow.  \par \par Last labs showed T4 18.\par He is supposed to see ENT for scratchy voice\par Went for US thyroid at [facility]:  "Heterogeneous thyroid with ill defined nodularity....No discrete nodules.  The appearance is similar to the prior study of February 2013..."\par \par Currently taking methimazole 10 mg x 3.\par Will go to [facility], call me a few days later.\par He made appt to see me END of Nov.\par Today I indicated he also needs Nov 2.  \par \par \par Feb 24, 2021     iPhone  \par \par PCP: Dr. Lilly fax 1 \par            .\par            CC: Hyperthyroid  12/19/2011 -diagnosis (RAIU 46 %, TSI and TBII elevated)\par             u/s at MRI 11/2012 and 2/2013: simple goiter\par               1/2012 - started methimazole\par             Elevated BMI (but losing weight)\par \par             Elevated PC BS \par \par 55 yo  will be going to [facility] for today or tomorrow.\par Most recent labs ([facility]) from November 20 included\par glucose 100 mg/dl\par T4 13\par T3  187 \par TSH < 0.01\par A1c 5.9 %\par u/a  urobilinogen 3 (<2)\par \par            .\par Impression:  Hyperthyroid d/t Graves diseease.\par Plan:  Updated labs - he said he will go to [facility] in next day or so and then call me on weekend for resutls\par ROV by erarly May\par          \par \par \par \par Nov 20, 2020     in person\par \par PCP: Dr. Lilly fax 8 \par            .\par            CC: Hyperthyroid  12/19/2011 -diagnosis (RAIU 46 %, TSI and TBII elevated)\par             u/s at MRI 11/2012 and 2/2013: simple goiter\par               1/2012 - started methimazole\par             Elevated BMI (but losing weight)\par \par             Elevated PC BS \par            .\par          \par Has methimazole 5 mg tablets, x 4 a day (total of 20 mg).    Ran out a few days ago.\par Sept 4, 2020 T3   222 ()    T4  13    TSH undetectable  \par \par Examination: \par Constitutional:  Alert, well nourished, healthy appearance, no acute distress \par Eyes:  No proptosis, no lid lag\par Thyroid:\par Pulmonary:  No respiratory distress, no accessory muscle use; normal rate and effort\par Cardiac:  Normal rate\par Vascular: \par Endocrine:  No stigmata of Cushing’s Syndrome\par Musculoskeletal:  Normal gait, no involuntary movements\par Neurology:  No tremors\par Affect/Mood/Psych:  Oriented x 3; normal affect, normal insight/judgement, normal mood \par .\par \par Impression: TBII elevated, TFTs eleevated despite methimazole.\par Plan:  advised I-131 or thyroid surgery.    He prefers the methimazole for now.  \par \par \par Sep 03, 2020     in person\par \par PCP: Dr. Lilly fax 1 [phone]\par            .\par            CC: Hyperthyroid  12/19/2011 -diagnosis (RAIU 46 %, TSI and TBII elevated)\par             u/s at MRI 11/2012 and 2/2013: simple goiter\par               1/2012 - started methimazole\par             Elevated BMI (but losing weight)\par \par             Elevated PC BS \par            .\par            Taking methimazole 5 mg tablets, three daily.\par \par \par Mar 24, 2020\par \par PCP: Dr. Lilly fax [phone]\par            .\par            CC: Hyperthyroid  12/19/2011 -diagnosis (RAIU 46 %, TSI and TBII elevated)\par             u/s at MRI 11/2012 and 2/2013: simple goiter\par               1/2012 - started methimazole\par             Elevated BMI (but losing weight)\par \par             Elevated PC BS \par            .\par            Taking methimazole 5 mg tablets, three daily.\par \par Impression:  TSh suppressed, T4 and T3 both elevated as is TSI.\par He is not interested in I-131 or thyroid surgery.  In fact, it is challenging to convince to stay on the\par methimazole since he feels so well.  \par \par Plan:  Increase methimazole from 15 mg a day to 20 mg/day. \par \par \par \par Oct 02, 2019\par \par PCP: Dr. Lilly fax 1 [phone]\par            .\par            CC: Hyperthyroid\par             12/19/2011 -diagnosis (RAIU 46 %, TSI and TBII elevated)\par             u/s at MRI 11/2012 and 2/2013: simple goiter\par  1/2012 - started methimazole\par             Elevated BMI (but losing weight)\par            .\par          Taking methimazole 5 mg, one daily alternating with two daily.\par \par Now taking methimazole two 5 mg daily to [place].  \par Went for labs yesterday....not back.\par \par Plan:  Same Rx and labs before January visit.  \par \par \par \par \par \par May 24, 2019\par    PCP: Dr. Lilly fax 1 [phone]\par            .\par            CC: Hyperthyroid\par             12/19/2011 -diagnosis (RAIU 46 %, TSI and TBII elevated)\par             u/s at MRI 11/2012 and 2/2013: simple goiter\par  1/2012 - started methimazole\par             Elevated BMI (but losing weight)\par            .\par          Taking methimazole 5 mg, one daily alternating with two daily.\par \par \par Today I reminded him of the potential side effects of methimazole including rash, hives, arthritis, hepatitis, agranulocytosis and death.    He prefers to take methimazole rather than the know safe option of I-131 or even surgery.\par So,he will increase the methimazole to 5 mg, two tablets daily, repeat labs before next visit in August.  \par As he reports:    "I feel great".  \par \par \par January 15, 2019\par \par \par \par \par Taking methimazole 5 mg daily.\par Recent labs at [facility] show suppressed TSH.\par Plan:  Increase methiamzole to 5 mg tablets:  one on odd days, two on even days.\par Repeat labs at [facility] before next visit in April.  \par To [facility] in [place] for refills.  \par \par Previous notes from [employer] appended below.\par \par  Feb 1, 2018\par            .\par            PCP: Dr. Lilly fax 1 [phone]\par            .\par            CC: Hyperthyroid\par             12/19/2011 -diagnosis (RAIU 46 %, TSI and TBII elevated)\par             u/s at MRI 11/2012 and 2/2013: simple goiter\par             1/2012 - started methimazole\par             Elevated BMI (but losing weight)\par            .\par            Taking methimazole 5 mg x 8 tabs a week.\par            1/27/2018\par            TSH 0.49\par             (<140)\par            .\par            Impression: Doing well.\par            Declines I-131\par            .\par            Pllan: Same Rx\par            Warned of potential side effects of methimazole. \par            .\par            .\par            ==\par            .\par            Oct 4, 2017\par            .\par            PCP: Dr. Lilly fax [phone]\par            .\par            CC: Hyperthyroid\par             12/19/2011 -diagnosis (RAIU 46 %, TSI and TBII elevated)\par             u/s at MRI 11/2012 and 2/2013: simple goiter\par             1/2012 - started methimazole\par             Elevated BMI (but losing weight)\par            .\par            Taking methimazole 5 mg x 9 tabs a week.\par            TFTs in good range.\par            .\par            OGTT completely normal.\par            .\par            Has been exercise and has lost weight. \par            .\par            Plan: Dec methimazole to 8 ills a week \par            ROV in January.\par            .\par            ==\par            .\par            June 21, 2017\par            .\par            PCP: Dr. Lilly fax [phone]\par            .\par            CC: Hyperthyroid\par             12/19/2011 -diagnosis (RAIU 46 %, TSI and TBII elevated)\par             u/s at MRI 11/2012 and 2/2013: simple goiter\par             1/2012 - started methimazole\par             Elevated BMI\par            .\par             Smoker\par             A1c 6.0\par             Decreased HDL/Elevated LDL\par            .\par            Taking methimazole 5 mg tablets.\par            Because of lowish TSH on 8 tablets a week, at last visit in January, dose incresed to 9 tablets a week.\par            .\par            Recent labs show TSH in normal range.\par            .\par            Impression: Hyperthyroidism well controlled on methimazole 5 mg tablets, 9 tablets a week. Has not gone into remission despite treatment with methimazole in early 2012 and thus he is not likely to go into a remission. He is awasre that his family history, elevted BMI, elevated LDL, lowe HDL are all cardiac risk factors and will discuss further with Dr. Lilly. \par            .\par            Plan: Same Rx. Formal 2 hour OGTT. \par            .\par            .\par            ==\par            .\par            January 30, 2017\par            .\par            PCP: Dr. Lilly fax 1 [phone]\par            .\par            CC: Hyperthyroid\par             12/19/2011 -diagnosis (RAIU 46 %, TSI and TBII elevated)\par             u/s at MRI 11/2012 and 2/2013: simple goiter\par             1/2012 - started methimazole\par             Elevated BMI\par            .\par             Smoker\par            .\par            Last visit TSH low on methimazole 5 mg x 8 tabs/week so\par            dose inc to 5 mg x 9 tabs/week and\par            1/26/2017 TFTs show TSH 1.3\par            \par            HDL 31 \par            .\par            Impression: Thyroi controlled on 9 tabls methimazole a week.\par            .\par            Plan: methiamzole 5 mg x 9 tabs aweek.\par            ROV 3 months. - \par            .\par            ==\par            .\par            October 18, 2016\par            .\par            PCP: Dr. Lilly fax 1 [phone]\par            .\par            CC: Hyperthyroid\par             12/19/2011 -diagnosis (RAIU 46 %, TSI and TBII elevated)\par             u/s at MRI 11/2012 and 2/2013: simple goiter\par             1/2012 - started methimazole\par             Elevated BMI\par            .\par             Smoker\par            .\par            Doing well on methimazole 5 mg, two on Sunday.\par            TSH is slightly low.\par            .\par            Plan: methimazole 5 mg 8 a week to 9 a week.\par            ROV -\par            .\par            ==\par            .\par            August 1, 2016\par            .\par            PCP: Dr. Lilly fax 1 [phone]\par            .\par            CC: Hyperthyroid\par             12/19/2011 -diagnosis (RAIU 46 %, TSI and TBII elevated)\par             u/s at MRI 11/2012 and 2/2013: simple goiter\par             1/2012 - started methimazole\par             Elevated BMI\par            .\par            52 yo returns for hyperthyroidism due to Graves' disease, diagnosed in December of 2011 and started on methimazole in January of 2012. \par            He got dose of methimazole down to 2.5 mg alternating with 5 mg, but then TFTs went back up. \par            Last here in May and is on methimazole 5 mg daily.\par            Most recent blood tests ([facility]) on\par            7/29/2016 included\par            TSH 0.25 ***\par            T3 112\par            T4 8. \par            .\par            He feels well.\par            .\par            Impression: Not going into remission and not likely to. He is well aware of this, but prefers to stay on methimazole. He understands the risks of methimazole and the risks of thyroid overactivity. He understands that I-131 and surgery are definitive options to treat the thyroid overactivity.\par            .\par            Plan: As per his wishes, continue methimazole 5 mg daily, but take two pills on Sundays. Updated TFTs, LFTs, CBC within 8 weeks. Thank you. -jh\par            .\par            ==\par            .\par            May 10, 2016\par            .\par            PCP: Dr. Lilly fax [phone]\par            .\par            CC: Hyperthyroid\par             12/19/2011 -diagnosis\par             1/2012 - started methimazole\par            .Feels well on methimazole 5 mg daily.\par            .\par            Impression: Not going into remission.\par            Not interested in I-131 or surgery.\par            Aware of risks of methimazole\par            Suppressed TSH indicates he is still mildly hypethyroid. \par            .\par            Plan: Updated TFTs before next visit in July. \par            .\par            ==\par            .\par            January 27, 2016\par            .\par            PCP: Dr. Lilly fax [phone]\par            .\par            CC: Hyperthyroid\par             12/19/2011 -diagnosis\par             1/2012 - started methimazole\par            .\par            He has been noting occasional palpitations,\par            occasional shakiness.\par            .\par            Plan: Increase methimazole to 5 mg daily for now and repeat TFTs in 10 weeks. He is not yet interested in I-131\par            ROV in 11 weeks. -jh\par            .\par            ==\par            .\par            July 24, 2015\par            .\par            PCP: Dr. Lilly fax [phone]\par            .\par            CC: Hyperthyroid\par             12/19/2011 -diagnosis\par             1/2012 - started methimazole\par            .\par             Remains on methimazole 5 mg daily.\par            TFTs in good range.\par            Plan: Decrease methimazole to 2.5 mg alternating with 5 mg and\par            ROV after next TFTs in a few months\par            .\par            ==\par            .\par            April 3, 2015\par            .\par            PCP: Dr. Lilly fax [phone]\par            .\par            CC: Hyperthyroid\par             12/19/2011 -diagnosis\par             1/2012 - started methimazole\par            .\par            Today he reports he feels well \par            .\par            Imp: On methimazole 10 alt with 5.\par            .\par            Plan: 5 mg daily.\par            ROV in July.\par            .\par            ==\par            Dec 19, 2014\par            PCP: Dr. Lilly fax [phone]\par            CC: Hyperthyroid\par             12/19/2011 -diagnosis\par             1/2012 - started methimazole\par            .\par            Currently taking methimazole 10 mg alt with 5 mg last vist, dec from\par            10 mg daily.\par            Recenr TSI elev at 250 (< 140%)\par            T4 7.9 TSH 1.9\par            .\par            Impression: On methimazole for almost 3 years - not in remission but hyperthyroidism is controlled on 10 mg al with 5. TSI still elevated, also against chances of remission. \par            .\par            Plan: I-131 offered and declined. Same Rx. andn ROV in 3 1/2 months after labs.\par            .\par            ===\par            Sept 19, 2014\par            PCP: Dr. Lilly\par            CC: Hyperthyroid\par             12/19/2011 - Thyroid scan 46% update\par             TSI and TBII both elevated.\par             1/2012 - started methimazole\par            .\par            Currently on methimazole 10 mg daily. \par            In Feb 2014, TSH suppressed on 5 mg/day.\par            .\par            Recent TFTs are within normal (on 10 mg daily.\par            Sonogram Nov 2012 - normal.\par            .\par            Impression: As he did not go into a remission after two years of methimazole, the probability of a long term remission at this point is much decreased. However, there is room to lower the dose of methimazole from 10 mg daily.\par            .\par            Plan: Decrease to 10 mg alt with 5 mg, repeat labs in Nov and ROV Dec.\par

## 2022-03-07 ENCOUNTER — APPOINTMENT (OUTPATIENT)
Dept: ENDOCRINOLOGY | Facility: CLINIC | Age: 57
End: 2022-03-07
Payer: MEDICAID

## 2022-03-07 PROCEDURE — 99214 OFFICE O/P EST MOD 30 MIN: CPT | Mod: 95

## 2022-03-07 NOTE — HISTORY OF PRESENT ILLNESS
[Home] : at home, [unfilled] , at the time of the visit. [Medical Office: ([facility])___] : at the medical office located in  [Verbal consent obtained from patient] : the patient, [unfilled] [FreeTextEntry1] : Mar 07, 2022      iPhone\par \par PCP: Dr. Lilly fax 9 \par           Onc:  Dr. Luis Alberto Love p   fax [phone]              \par            .\par            CC: Hyperthyroid  12/19/2011 -diagnosis (RAIU 46 %, TSI and TBII elevated)\par             u/s at MRI 11/2012 and 2/2013: simple goiter\par               1/2012 - started methimazole\par             Elevated BMI (but losing weight)\par \par             Elevated PC BS \par             Lung cancer\par \par Taking methimazole 10 mg x 3 tabs a day \par \par Done with chemotherapy and now on immunotherapy alone.\par Recent [facility] labs show big improvement in thyroid overactivity.\par \par Plan:  Decrease methimazole to 10 mg two daioy from three daily and repeat labs before next vitis.  \par \par \par Feb 14, 2022     [employer]\par \par PCP: Dr. Lilly fax 7 \par           Onc:  Dr. Luis Alberto Love p   fax [phone]              \par            .\par            CC: Hyperthyroid  12/19/2011 -diagnosis (RAIU 46 %, TSI and TBII elevated)\par             u/s at MRI 11/2012 and 2/2013: simple goiter\par               1/2012 - started methimazole\par             Elevated BMI (but losing weight)\par \par             Elevated PC BS \par             Lung cancer\par \par Taking methimazole 10 mg x 3 tabs a day \par [facility] labs from 1/21/2022 included\par creatinine 0.64\par TSH 0.03\par T4 16.3\par T3 231\par WBC 2.5\par Hct 22.1\par polys 943\par lymphs 1270   \par plats 61\par \par \par   2/10/2022  [facility] labs:\par \par BS 98\par creatinine 0.61\par A1c 4.3 \par albumin 3.2\par TSH < 0.01\par T4 18\par T3 401\par Hct 26.3\par B12 656\par folate 8.1 \par vit D 32 \par \par After 4 rounds of chemo, he was put on a holiday.\par Started on iron.\par \par Impression:  Mr. Ulloa rarely takes his thyroid medicaton because he feels well.\par Plan:  I strongly encouraged him to take the methimazole religiously and to have a follow up visit 3/7/2022.\par after updated labs.     \par \par \par \par Jan 24, 2022     iPhone\par \par PCP: Dr. Lilly fax 4 \par           Onc:  Dr. Sahu [facility] p   fax [phone]         io      \par            .\par            CC: Hyperthyroid  12/19/2011 -diagnosis (RAIU 46 %, TSI and TBII elevated)\par             u/s at MRI 11/2012 and 2/2013: simple goiter\par               1/2012 - started methimazole\par             Elevated BMI (but losing weight)\par \par             Elevated PC BS \par             Lung cancer\par \par Taking methimazole 10 mg x 3 tabs a day \par [facility] labs from 1/21/2022 include\par creatinine 0.64\par TSH 0.03\par T4 16.3\par T3 231\par WBC 2.5\par Hct 22.1\par polys 943\par lymphs 1270   \par plats 61\par \par Impression:  Tolerating the hyperthyroidism without symptoms.\par Taking methimazole 30 mg daily.\par Says he is going for PET-CT soon.\par \par Plan:  Same Rx for now.\par I will check with Dr. Love regarding his perspective on the CBC.  [done]\par ROV Feb 14 after labs.\par \par \par \par \par \par Tu 10, 2022     iPhone\par \par PCP: Dr. Lilly fax 6 \par            .\par            CC: Hyperthyroid  12/19/2011 -diagnosis (RAIU 46 %, TSI and TBII elevated)\par             u/s at MRI 11/2012 and 2/2013: simple goiter\par               1/2012 - started methimazole\par             Elevated BMI (but losing weight)\par \par             Elevated PC BS \par             Lung cancer\par \par 58 yo called today to request [facility] form and said he would make appointment after that.   Wife told the  that he could not do a telephone visit because he was in the hospital.\par Did not add up:     So by 5:00 pm today we did a videochat.\par I had provided his records to Endocrine team when he was in [facility].\par He is currently taking methimazole 10 mg TID.\par \par Says he feels well.\par \par Plan:   He will ask his oncologist to fax update on current status.\par I have emailed [facility] for to Mr. Ulloa and he will call me one week later to discuss methimazole dose\par and I will again advise I-131.    \par \par \par \par 11/4/ 2021     \par \par Mr. Ulloa calls to report that after July visit, he saw ENT, Dr. Andres for scratchy throat\par then went for CT+ chest, told of mass, biopsy positive for small cell lung cancer and\par he is now seeing oncologist, Dr. Victorino Che at [facility] and will start chemotherapy this week and\par expects that to be followed by radiation (w/o surgery).\par He has no recent TFTs.\par Last T4 elevated.\par He has a [facility] form.\par Agrees to go to [facility] tomorrow and to call me a few days later.  -jh\par \par \par \par \par \par Jul 09, 2021    iPhone \par \par PCP: Dr. Lilly fax 7 \par            .\par            CC: Hyperthyroid  12/19/2011 -diagnosis (RAIU 46 %, TSI and TBII elevated)\par             u/s at MRI 11/2012 and 2/2013: simple goiter\par               1/2012 - started methimazole\par             Elevated BMI (but losing weight)\par \par             Elevated PC BS \par \par Has a problem with sensor in his care.\par Because of that, he could not come in toay and did not have a chance to stop by [facility] for \par lab tests but says he will go to [facility] tomorrow.  \par \par Last labs showed T4 18.\par He is supposed to see ENT for scratchy voice\par Went for US thyroid at [facility]:  "Heterogeneous thyroid with ill defined nodularity....No discrete nodules.  The appearance is similar to the prior study of February 2013..."\par \par Currently taking methimazole 10 mg x 3.\par Will go to [facility], call me a few days later.\par He made appt to see me END of Nov.\par Today I indicated he also needs Nov 2.  \par \par \par Feb 24, 2021     iPhone  \par \par PCP: Dr. Lilly fax 8 \par            .\par            CC: Hyperthyroid  12/19/2011 -diagnosis (RAIU 46 %, TSI and TBII elevated)\par             u/s at MRI 11/2012 and 2/2013: simple goiter\par               1/2012 - started methimazole\par             Elevated BMI (but losing weight)\par \par             Elevated PC BS \par \par 55 yo  will be going to [facility] for today or tomorrow.\par Most recent labs ([facility]) from November 20 included\par glucose 100 mg/dl\par T4 13\par T3  187 \par TSH < 0.01\par A1c 5.9 %\par u/a  urobilinogen 3 (<2)\par \par            .\par Impression:  Hyperthyroid d/t Graves diseease.\par Plan:  Updated labs - he said he will go to [facility] in next day or so and then call me on weekend for resutls\par ROV by erarly May\par          \par \par \par \par Nov 20, 2020     in person\par \par PCP: Dr. Lilly fax 2 \par            .\par            CC: Hyperthyroid  12/19/2011 -diagnosis (RAIU 46 %, TSI and TBII elevated)\par             u/s at MRI 11/2012 and 2/2013: simple goiter\par               1/2012 - started methimazole\par             Elevated BMI (but losing weight)\par \par             Elevated PC BS \par            .\par          \par Has methimazole 5 mg tablets, x 4 a day (total of 20 mg).    Ran out a few days ago.\par Sept 4, 2020 T3   222 ()    T4  13    TSH undetectable  \par \par Examination: \par Constitutional:  Alert, well nourished, healthy appearance, no acute distress \par Eyes:  No proptosis, no lid lag\par Thyroid:\par Pulmonary:  No respiratory distress, no accessory muscle use; normal rate and effort\par Cardiac:  Normal rate\par Vascular: \par Endocrine:  No stigmata of Cushing’s Syndrome\par Musculoskeletal:  Normal gait, no involuntary movements\par Neurology:  No tremors\par Affect/Mood/Psych:  Oriented x 3; normal affect, normal insight/judgement, normal mood \par .\par \par Impression: TBII elevated, TFTs eleevated despite methimazole.\par Plan:  advised I-131 or thyroid surgery.    He prefers the methimazole for now.  \par \par \par Sep 03, 2020     in person\par \par PCP: Dr. iLlly fax 1 [phone]\par            .\par            CC: Hyperthyroid  12/19/2011 -diagnosis (RAIU 46 %, TSI and TBII elevated)\par             u/s at MRI 11/2012 and 2/2013: simple goiter\par               1/2012 - started methimazole\par             Elevated BMI (but losing weight)\par \par             Elevated PC BS \par            .\par            Taking methimazole 5 mg tablets, three daily.\par \par \par Mar 24, 2020\par \par PCP: Dr. Lilly fax [phone]\par            .\par            CC: Hyperthyroid  12/19/2011 -diagnosis (RAIU 46 %, TSI and TBII elevated)\par             u/s at MRI 11/2012 and 2/2013: simple goiter\par               1/2012 - started methimazole\par             Elevated BMI (but losing weight)\par \par             Elevated PC BS \par            .\par            Taking methimazole 5 mg tablets, three daily.\par \par Impression:  TSh suppressed, T4 and T3 both elevated as is TSI.\par He is not interested in I-131 or thyroid surgery.  In fact, it is challenging to convince to stay on the\par methimazole since he feels so well.  \par \par Plan:  Increase methimazole from 15 mg a day to 20 mg/day. \par \par \par \par Oct 02, 2019\par \par PCP: Dr. Lilly fax [phone]\par            .\par            CC: Hyperthyroid\par             12/19/2011 -diagnosis (RAIU 46 %, TSI and TBII elevated)\par             u/s at MRI 11/2012 and 2/2013: simple goiter\par  1/2012 - started methimazole\par             Elevated BMI (but losing weight)\par            .\par          Taking methimazole 5 mg, one daily alternating with two daily.\par \par Now taking methimazole two 5 mg daily to [facility].  \par Went for labs yesterday....not back.\par \par Plan:  Same Rx and labs before January visit.  \par \par \par \par \par \par May 24, 2019\par    PCP: Dr. Lilly fax [phone]\par            .\par            CC: Hyperthyroid\par             12/19/2011 -diagnosis (RAIU 46 %, TSI and TBII elevated)\par             u/s at MRI 11/2012 and 2/2013: simple goiter\par  1/2012 - started methimazole\par             Elevated BMI (but losing weight)\par            .\par          Taking methimazole 5 mg, one daily alternating with two daily.\par \par \par Today I reminded him of the potential side effects of methimazole including rash, hives, arthritis, hepatitis, agranulocytosis and death.    He prefers to take methimazole rather than the know safe option of I-131 or even surgery.\par So,he will increase the methimazole to 5 mg, two tablets daily, repeat labs before next visit in August.  \par As he reports:    "I feel great".  \par \par \par January 15, 2019\par \par \par \par \par Taking methimazole 5 mg daily.\par Recent labs at [facility] show suppressed TSH.\par Plan:  Increase methiamzole to 5 mg tablets:  one on odd days, two on even days.\par Repeat labs at [facility] before next visit in April.  \par To [facility] in [place] for refills.  \par \par Previous notes from [employer] appended below.\par \par  Feb 1, 2018\par            .\par            PCP: Dr. Lilly fax [phone]\par            .\par            CC: Hyperthyroid\par             12/19/2011 -diagnosis (RAIU 46 %, TSI and TBII elevated)\par             u/s at MRI 11/2012 and 2/2013: simple goiter\par             1/2012 - started methimazole\par             Elevated BMI (but losing weight)\par            .\par            Taking methimazole 5 mg x 8 tabs a week.\par            1/27/2018\par            TSH 0.49\par             (<140)\par            .\par            Impression: Doing well.\par            Declines I-131\par            .\par            Pllan: Same Rx\par            Warned of potential side effects of methimazole. \par            .\par            .\par            ==\par            .\par            Oct 4, 2017\par            .\par            PCP: Dr. Lilly fax 1 [phone]\par            .\par            CC: Hyperthyroid\par             12/19/2011 -diagnosis (RAIU 46 %, TSI and TBII elevated)\par             u/s at MRI 11/2012 and 2/2013: simple goiter\par             1/2012 - started methimazole\par             Elevated BMI (but losing weight)\par            .\par            Taking methimazole 5 mg x 9 tabs a week.\par            TFTs in good range.\par            .\par            OGTT completely normal.\par            .\par            Has been exercise and has lost weight. \par            .\par            Plan: Dec methimazole to 8 ills a week \par            ROV in January.\par            .\par            ==\par            .\par            June 21, 2017\par            .\par            PCP: Dr. Lilly fax [phone]\par            .\par            CC: Hyperthyroid\par             12/19/2011 -diagnosis (RAIU 46 %, TSI and TBII elevated)\par             u/s at MRI 11/2012 and 2/2013: simple goiter\par             1/2012 - started methimazole\par             Elevated BMI\par            .\par             Smoker\par             A1c 6.0\par             Decreased HDL/Elevated LDL\par            .\par            Taking methimazole 5 mg tablets.\par            Because of lowish TSH on 8 tablets a week, at last visit in January, dose incresed to 9 tablets a week.\par            .\par            Recent labs show TSH in normal range.\par            .\par            Impression: Hyperthyroidism well controlled on methimazole 5 mg tablets, 9 tablets a week. Has not gone into remission despite treatment with methimazole in early 2012 and thus he is not likely to go into a remission. He is awasre that his family history, elevted BMI, elevated LDL, lowe HDL are all cardiac risk factors and will discuss further with Dr. Lilly. \par            .\par            Plan: Same Rx. Formal 2 hour OGTT. \par            .\par            .\par            ==\par            .\par            January 30, 2017\par            .\par            PCP: Dr. Lilly fax [phone]\par            .\par            CC: Hyperthyroid\par             12/19/2011 -diagnosis (RAIU 46 %, TSI and TBII elevated)\par             u/s at MRI 11/2012 and 2/2013: simple goiter\par             1/2012 - started methimazole\par             Elevated BMI\par            .\par             Smoker\par            .\par            Last visit TSH low on methimazole 5 mg x 8 tabs/week so\par            dose inc to 5 mg x 9 tabs/week and\par            1/26/2017 TFTs show TSH 1.3\par            \par            HDL 31 \par            .\par            Impression: Thyroi controlled on 9 tabls methimazole a week.\par            .\par            Plan: methiamzole 5 mg x 9 tabs aweek.\par            ROV 3 months. - \par            .\par            ==\par            .\par            October 18, 2016\par            .\par            PCP: Dr. Lilly fax [phone]\par            .\par            CC: Hyperthyroid\par             12/19/2011 -diagnosis (RAIU 46 %, TSI and TBII elevated)\par             u/s at MRI 11/2012 and 2/2013: simple goiter\par             1/2012 - started methimazole\par             Elevated BMI\par            .\par             Smoker\par            .\par            Doing well on methimazole 5 mg, two on Sunday.\par            TSH is slightly low.\par            .\par            Plan: methimazole 5 mg 8 a week to 9 a week.\par            ROV -\par            .\par            ==\par            .\par            August 1, 2016\par            .\par            PCP: Dr. Lilly fax 1 [phone]\par            .\par            CC: Hyperthyroid\par             12/19/2011 -diagnosis (RAIU 46 %, TSI and TBII elevated)\par             u/s at MRI 11/2012 and 2/2013: simple goiter\par             1/2012 - started methimazole\par             Elevated BMI\par            .\par            52 yo returns for hyperthyroidism due to Graves' disease, diagnosed in December of 2011 and started on methimazole in January of 2012. \par            He got dose of methimazole down to 2.5 mg alternating with 5 mg, but then TFTs went back up. \par            Last here in May and is on methimazole 5 mg daily.\par            Most recent blood tests ([facility]) on\par            7/29/2016 included\par            TSH 0.25 ***\par            T3 112\par            T4 8. \par            .\par            He feels well.\par            .\par            Impression: Not going into remission and not likely to. He is well aware of this, but prefers to stay on methimazole. He understands the risks of methimazole and the risks of thyroid overactivity. He understands that I-131 and surgery are definitive options to treat the thyroid overactivity.\par            .\par            Plan: As per his wishes, continue methimazole 5 mg daily, but take two pills on Sundays. Updated TFTs, LFTs, CBC within 8 weeks. Thank you. -jh\par            .\par            ==\par            .\par            May 10, 2016\par            .\par            PCP: Dr. Lilly fax 1 [phone]\par            .\par            CC: Hyperthyroid\par             12/19/2011 -diagnosis\par             1/2012 - started methimazole\par            .Feels well on methimazole 5 mg daily.\par            .\par            Impression: Not going into remission.\par            Not interested in I-131 or surgery.\par            Aware of risks of methimazole\par            Suppressed TSH indicates he is still mildly hypethyroid. \par            .\par            Plan: Updated TFTs before next visit in July. \par            .\par            ==\par            .\par            January 27, 2016\par            .\par            PCP: Dr. Lilly fax 1 [phone]\par            .\par            CC: Hyperthyroid\par             12/19/2011 -diagnosis\par             1/2012 - started methimazole\par            .\par            He has been noting occasional palpitations,\par            occasional shakiness.\par            .\par            Plan: Increase methimazole to 5 mg daily for now and repeat TFTs in 10 weeks. He is not yet interested in I-131\par            ROV in 11 weeks. -jh\par            .\par            ==\par            .\par            July 24, 2015\par            .\par            PCP: Dr. Lilly fax [phone]\par            .\par            CC: Hyperthyroid\par             12/19/2011 -diagnosis\par             1/2012 - started methimazole\par            .\par             Remains on methimazole 5 mg daily.\par            TFTs in good range.\par            Plan: Decrease methimazole to 2.5 mg alternating with 5 mg and\par            ROV after next TFTs in a few months\par            .\par            ==\par            .\par            April 3, 2015\par            .\par            PCP: Dr. Lilly fax [phone]\par            .\par            CC: Hyperthyroid\par             12/19/2011 -diagnosis\par             1/2012 - started methimazole\par            .\par            Today he reports he feels well \par            .\par            Imp: On methimazole 10 alt with 5.\par            .\par            Plan: 5 mg daily.\par            ROV in July.\par            .\par            ==\par            Dec 19, 2014\par            PCP: Dr. Lilly fax [phone]\par            CC: Hyperthyroid\par             12/19/2011 -diagnosis\par             1/2012 - started methimazole\par            .\par            Currently taking methimazole 10 mg alt with 5 mg last vist, dec from\par            10 mg daily.\par            Recenr TSI elev at 250 (< 140%)\par            T4 7.9 TSH 1.9\par            .\par            Impression: On methimazole for almost 3 years - not in remission but hyperthyroidism is controlled on 10 mg al with 5. TSI still elevated, also against chances of remission. \par            .\par            Plan: I-131 offered and declined. Same Rx. andn ROV in 3 1/2 months after labs.\par            .\par            ===\par            Sept 19, 2014\par            PCP: Dr. Lilly\par            CC: Hyperthyroid\par             12/19/2011 - Thyroid scan 46% update\par             TSI and TBII both elevated.\par             1/2012 - started methimazole\par            .\par            Currently on methimazole 10 mg daily. \par            In Feb 2014, TSH suppressed on 5 mg/day.\par            .\par            Recent TFTs are within normal (on 10 mg daily.\par            Sonogram Nov 2012 - normal.\par            .\par            Impression: As he did not go into a remission after two years of methimazole, the probability of a long term remission at this point is much decreased. However, there is room to lower the dose of methimazole from 10 mg daily.\par            .\par            Plan: Decrease to 10 mg alt with 5 mg, repeat labs in Nov and ROV Dec.\par

## 2022-08-07 ENCOUNTER — APPOINTMENT (OUTPATIENT)
Dept: ENDOCRINOLOGY | Facility: CLINIC | Age: 57
End: 2022-08-07

## 2022-08-07 PROCEDURE — 99215 OFFICE O/P EST HI 40 MIN: CPT | Mod: 95

## 2022-08-12 ENCOUNTER — APPOINTMENT (OUTPATIENT)
Dept: ENDOCRINOLOGY | Facility: CLINIC | Age: 57
End: 2022-08-12

## 2022-08-12 PROCEDURE — 99214 OFFICE O/P EST MOD 30 MIN: CPT | Mod: 95

## 2022-08-12 NOTE — HISTORY OF PRESENT ILLNESS
[Home] : at home, [unfilled] , at the time of the visit. [Medical Office: ([facility])___] : at the medical office located in  [Verbal consent obtained from patient] : the patient, [unfilled] [FreeTextEntry1] : Aug 07, 2022      iPhone\par \par PCP: Dr. Lilly fax 0 \par           Onc:  Dr. Luis Alberto Love p   fax [phone]              \par            .\par            CC: Hyperthyroid  12/19/2011 -diagnosis (RAIU 46 %, TSI and TBII elevated)\par             u/s at MRI 11/2012 and 2/2013: simple goiter\par               1/2012 - started methimazole\par             Elevated BMI (but losing weight)\par \par             Elevated PC BS \par             Lung cancer\par \par Taking methimazole 10 mg x 3 daily.\par No recent lab tests.\par Sets up visit because he needs revills.\par Reports, as usual, "I feel great."\par \par Imp/Plan:   Discussed compliance with patient and his sister.\par Long strategy session.  \par He has other complicated medical issues. \par \par \par Mar 07, 2022      iPhone\par \par PCP: Dr. Lilly fax 4 \par           Onc:  Dr. Luis Alberto Love p   fax [phone]              \par            .\par            CC: Hyperthyroid  12/19/2011 -diagnosis (RAIU 46 %, TSI and TBII elevated)\par             u/s at MRI 11/2012 and 2/2013: simple goiter\par               1/2012 - started methimazole\par             Elevated BMI (but losing weight)\par \par             Elevated PC BS \par             Lung cancer\par \par Taking methimazole 10 mg x 3 tabs a day \par \par Done with chemotherapy and now on immunotherapy alone.\par Recent [facility] labs show big improvement in thyroid overactivity.\par \par Plan:  Decrease methimazole to 10 mg two daily from three daily and repeat labs before next vitis.  \par \par \par Feb 14, 2022     [employer]\par \par PCP: Dr. Lilly fax 7 \par           Onc:  Dr. Luis Alberto Love p   fax [phone]              \par            .\par            CC: Hyperthyroid  12/19/2011 -diagnosis (RAIU 46 %, TSI and TBII elevated)\par             u/s at MRI 11/2012 and 2/2013: simple goiter\par               1/2012 - started methimazole\par             Elevated BMI (but losing weight)\par \par             Elevated PC BS \par             Lung cancer\par \par Taking methimazole 10 mg x 3 tabs a day \par [facility] labs from 1/21/2022 included\par creatinine 0.64\par TSH 0.03\par T4 16.3\par T3 231\par WBC 2.5\par Hct 22.1\par polys 943\par lymphs 1270   \par plats 61\par \par \par   2/10/2022  [facility] labs:\par \par BS 98\par creatinine 0.61\par A1c 4.3 \par albumin 3.2\par TSH < 0.01\par T4 18\par T3 401\par Hct 26.3\par B12 656\par folate 8.1 \par vit D 32 \par \par After 4 rounds of chemo, he was put on a holiday.\par Started on iron.\par \par Impression:  Mr. Ulloa rarely takes his thyroid medicaton because he feels well.\par Plan:  I strongly encouraged him to take the methimazole religiously and to have a follow up visit 3/7/2022.\par after updated labs.     \par \par \par \par Jan 24, 2022     iPhone\par \par PCP: Dr. Lilly fax 1 [phone]\par           Onc:  Dr. Sahu [facility] p   fax [phone]         io      \par            .\par            CC: Hyperthyroid  12/19/2011 -diagnosis (RAIU 46 %, TSI and TBII elevated)\par             u/s at MRI 11/2012 and 2/2013: simple goiter\par               1/2012 - started methimazole\par             Elevated BMI (but losing weight)\par \par             Elevated PC BS \par             Lung cancer\par \par Taking methimazole 10 mg x 3 tabs a day \par [facility] labs from 1/21/2022 include\par creatinine 0.64\par TSH 0.03\par T4 16.3\par T3 231\par WBC 2.5\par Hct 22.1\par polys 943\par lymphs 1270   \par plats 61\par \par Impression:  Tolerating the hyperthyroidism without symptoms.\par Taking methimazole 30 mg daily.\par Says he is going for PET-CT soon.\par \par Plan:  Same Rx for now.\par I will check with Dr. Love regarding his perspective on the CBC.  [done]\par ROV Feb 14 after labs.\par \par \par \par \par \par Tu 10, 2022     iPhone\par \par PCP: Dr. Lilly fax 5 \par            .\par            CC: Hyperthyroid  12/19/2011 -diagnosis (RAIU 46 %, TSI and TBII elevated)\par             u/s at MRI 11/2012 and 2/2013: simple goiter\par               1/2012 - started methimazole\par             Elevated BMI (but losing weight)\par \par             Elevated PC BS \par             Lung cancer\par \par 58 yo called today to request [facility] form and said he would make appointment after that.   Wife told the  that he could not do a telephone visit because he was in the hospital.\par Did not add up:     So by 5:00 pm today we did a videochat.\par I had provided his records to Endocrine team when he was in [facility].\par He is currently taking methimazole 10 mg TID.\par \par Says he feels well.\par \par Plan:   He will ask his oncologist to fax update on current status.\par I have emailed [facility] for to Mr. Ulloa and he will call me one week later to discuss methimazole dose\par and I will again advise I-131.    \par \par \par \par 11/4/ 2021     \par \par Mr. Ulloa calls to report that after July visit, he saw ENT, Dr. Andres for scratchy throat\par then went for CT+ chest, told of mass, biopsy positive for small cell lung cancer and\par he is now seeing oncologist, Dr. Victorino Che at [facility] and will start chemotherapy this week and\par expects that to be followed by radiation (w/o surgery).\par He has no recent TFTs.\par Last T4 elevated.\par He has a [facility] form.\par Agrees to go to [facility] tomorrow and to call me a few days later.  -\par \par \par \par \par \par Jul 09, 2021    iPhone \par \par PCP: Dr. Lilly fax 8 \par            .\par            CC: Hyperthyroid  12/19/2011 -diagnosis (RAIU 46 %, TSI and TBII elevated)\par             u/s at MRI 11/2012 and 2/2013: simple goiter\par               1/2012 - started methimazole\par             Elevated BMI (but losing weight)\par \par             Elevated PC BS \par \par Has a problem with sensor in his care.\par Because of that, he could not come in toay and did not have a chance to stop by [facility] for \par lab tests but says he will go to [facility] tomorrow.  \par \par Last labs showed T4 18.\par He is supposed to see ENT for scratchy voice\par Went for US thyroid at [facility]:  "Heterogeneous thyroid with ill defined nodularity....No discrete nodules.  The appearance is similar to the prior study of February 2013..."\par \par Currently taking methimazole 10 mg x 3.\par Will go to [facility], call me a few days later.\par He made appt to see me END of Nov.\par Today I indicated he also needs Nov 2.  \par \par \par Feb 24, 2021     iPhone  \par \par PCP: Dr. Lilly fax 7 \par            .\par            CC: Hyperthyroid  12/19/2011 -diagnosis (RAIU 46 %, TSI and TBII elevated)\par             u/s at MRI 11/2012 and 2/2013: simple goiter\par               1/2012 - started methimazole\par             Elevated BMI (but losing weight)\par \par             Elevated PC BS \par \par 55 yo  will be going to [facility] for today or tomorrow.\par Most recent labs ([facility]) from November 20 included\par glucose 100 mg/dl\par T4 13\par T3  187 \par TSH < 0.01\par A1c 5.9 %\par u/a  urobilinogen 3 (<2)\par \par            .\par Impression:  Hyperthyroid d/t Graves diseease.\par Plan:  Updated labs - he said he will go to [facility] in next day or so and then call me on weekend for resutls\par ROV by erarly May\par          \par \par \par \par Nov 20, 2020     in person\par \par PCP: Dr. Lilly fax 0 \par            .\par            CC: Hyperthyroid  12/19/2011 -diagnosis (RAIU 46 %, TSI and TBII elevated)\par             u/s at MRI 11/2012 and 2/2013: simple goiter\par               1/2012 - started methimazole\par             Elevated BMI (but losing weight)\par \par             Elevated PC BS \par            .\par          \par Has methimazole 5 mg tablets, x 4 a day (total of 20 mg).    Ran out a few days ago.\par Sept 4, 2020 T3   222 ()    T4  13    TSH undetectable  \par \par Examination: \par Constitutional:  Alert, well nourished, healthy appearance, no acute distress \par Eyes:  No proptosis, no lid lag\par Thyroid:\par Pulmonary:  No respiratory distress, no accessory muscle use; normal rate and effort\par Cardiac:  Normal rate\par Vascular: \par Endocrine:  No stigmata of Cushing’s Syndrome\par Musculoskeletal:  Normal gait, no involuntary movements\par Neurology:  No tremors\par Affect/Mood/Psych:  Oriented x 3; normal affect, normal insight/judgement, normal mood \par .\par \par Impression: TBII elevated, TFTs eleevated despite methimazole.\par Plan:  advised I-131 or thyroid surgery.    He prefers the methimazole for now.  \par \par \par Sep 03, 2020     in person\par \par PCP: Dr. Lilly fax 1 [phone]\par            .\par            CC: Hyperthyroid  12/19/2011 -diagnosis (RAIU 46 %, TSI and TBII elevated)\par             u/s at MRI 11/2012 and 2/2013: simple goiter\par               1/2012 - started methimazole\par             Elevated BMI (but losing weight)\par \par             Elevated PC BS \par            .\par            Taking methimazole 5 mg tablets, three daily.\par \par \par Mar 24, 2020\par \par PCP: Dr. Lilly fax 1 [phone]\par            .\par            CC: Hyperthyroid  12/19/2011 -diagnosis (RAIU 46 %, TSI and TBII elevated)\par             u/s at MRI 11/2012 and 2/2013: simple goiter\par               1/2012 - started methimazole\par             Elevated BMI (but losing weight)\par \par             Elevated PC BS \par            .\par            Taking methimazole 5 mg tablets, three daily.\par \par Impression:  TSh suppressed, T4 and T3 both elevated as is TSI.\par He is not interested in I-131 or thyroid surgery.  In fact, it is challenging to convince to stay on the\par methimazole since he feels so well.  \par \par Plan:  Increase methimazole from 15 mg a day to 20 mg/day. \par \par \par \par Oct 02, 2019\par \par PCP: Dr. Lilly fax [phone]\par            .\par            CC: Hyperthyroid\par             12/19/2011 -diagnosis (RAIU 46 %, TSI and TBII elevated)\par             u/s at MRI 11/2012 and 2/2013: simple goiter\par  1/2012 - started methimazole\par             Elevated BMI (but losing weight)\par            .\par          Taking methimazole 5 mg, one daily alternating with two daily.\par \par Now taking methimazole two 5 mg daily to [facility].  \par Went for labs yesterday....not back.\par \par Plan:  Same Rx and labs before January visit.  \par \par \par \par \par \par May 24, 2019\par    PCP: Dr. Lilly fax 1 [phone]\par            .\par            CC: Hyperthyroid\par             12/19/2011 -diagnosis (RAIU 46 %, TSI and TBII elevated)\par             u/s at MRI 11/2012 and 2/2013: simple goiter\par  1/2012 - started methimazole\par             Elevated BMI (but losing weight)\par            .\par          Taking methimazole 5 mg, one daily alternating with two daily.\par \par \par Today I reminded him of the potential side effects of methimazole including rash, hives, arthritis, hepatitis, agranulocytosis and death.    He prefers to take methimazole rather than the know safe option of I-131 or even surgery.\par So,he will increase the methimazole to 5 mg, two tablets daily, repeat labs before next visit in August.  \par As he reports:    "I feel great".  \par \par \par January 15, 2019\par \par \par \par \par Taking methimazole 5 mg daily.\par Recent labs at [facility] show suppressed TSH.\par Plan:  Increase methiamzole to 5 mg tablets:  one on odd days, two on even days.\par Repeat labs at [facility] before next visit in April.  \par To [facility] in [place] for refills.  \par \par Previous notes from [employer] appended below.\par \par  Feb 1, 2018\par            .\par            PCP: Dr. Lilly fax [phone]\par            .\par            CC: Hyperthyroid\par             12/19/2011 -diagnosis (RAIU 46 %, TSI and TBII elevated)\par             u/s at MRI 11/2012 and 2/2013: simple goiter\par             1/2012 - started methimazole\par             Elevated BMI (but losing weight)\par            .\par            Taking methimazole 5 mg x 8 tabs a week.\par            1/27/2018\par            TSH 0.49\par             (<140)\par            .\par            Impression: Doing well.\par            Declines I-131\par            .\par            Pllan: Same Rx\par            Warned of potential side effects of methimazole. \par            .\par            .\par            ==\par            .\par            Oct 4, 2017\par            .\par            PCP: Dr. Lilly fax 1 [phone]\par            .\par            CC: Hyperthyroid\par             12/19/2011 -diagnosis (RAIU 46 %, TSI and TBII elevated)\par             u/s at MRI 11/2012 and 2/2013: simple goiter\par             1/2012 - started methimazole\par             Elevated BMI (but losing weight)\par            .\par            Taking methimazole 5 mg x 9 tabs a week.\par            TFTs in good range.\par            .\par            OGTT completely normal.\par            .\par            Has been exercise and has lost weight. \par            .\par            Plan: Dec methimazole to 8 ills a week \par            ROV in January.\par            .\par            ==\par            .\par            June 21, 2017\par            .\par            PCP: Dr. Lilly fax 1 [phone]\par            .\par            CC: Hyperthyroid\par             12/19/2011 -diagnosis (RAIU 46 %, TSI and TBII elevated)\par             u/s at MRI 11/2012 and 2/2013: simple goiter\par             1/2012 - started methimazole\par             Elevated BMI\par            .\par             Smoker\par             A1c 6.0\par             Decreased HDL/Elevated LDL\par            .\par            Taking methimazole 5 mg tablets.\par            Because of lowish TSH on 8 tablets a week, at last visit in January, dose incresed to 9 tablets a week.\par            .\par            Recent labs show TSH in normal range.\par            .\par            Impression: Hyperthyroidism well controlled on methimazole 5 mg tablets, 9 tablets a week. Has not gone into remission despite treatment with methimazole in early 2012 and thus he is not likely to go into a remission. He is awasre that his family history, elevted BMI, elevated LDL, lowe HDL are all cardiac risk factors and will discuss further with Dr. Lilly. \par            .\par            Plan: Same Rx. Formal 2 hour OGTT. \par            .\par            .\par            ==\par            .\par            January 30, 2017\par            .\par            PCP: Dr. Lilly fax 1 [phone]\par            .\par            CC: Hyperthyroid\par             12/19/2011 -diagnosis (RAIU 46 %, TSI and TBII elevated)\par             u/s at MRI 11/2012 and 2/2013: simple goiter\par             1/2012 - started methimazole\par             Elevated BMI\par            .\par             Smoker\par            .\par            Last visit TSH low on methimazole 5 mg x 8 tabs/week so\par            dose inc to 5 mg x 9 tabs/week and\par            1/26/2017 TFTs show TSH 1.3\par            \par            HDL 31 \par            .\par            Impression: Thyroi controlled on 9 tabls methimazole a week.\par            .\par            Plan: methiamzole 5 mg x 9 tabs aweek.\par            ROV 3 months. - \par            .\par            ==\par            .\par            October 18, 2016\par            .\par            PCP: Dr. Lilly fax [phone]\par            .\par            CC: Hyperthyroid\par             12/19/2011 -diagnosis (RAIU 46 %, TSI and TBII elevated)\par             u/s at MRI 11/2012 and 2/2013: simple goiter\par             1/2012 - started methimazole\par             Elevated BMI\par            .\par             Smoker\par            .\par            Doing well on methimazole 5 mg, two on Sunday.\par            TSH is slightly low.\par            .\par            Plan: methimazole 5 mg 8 a week to 9 a week.\par            ROV -\par            .\par            ==\par            .\par            August 1, 2016\par            .\par            PCP: Dr. Lilly fax 1 [phone]\par            .\par            CC: Hyperthyroid\par             12/19/2011 -diagnosis (RAIU 46 %, TSI and TBII elevated)\par             u/s at MRI 11/2012 and 2/2013: simple goiter\par             1/2012 - started methimazole\par             Elevated BMI\par            .\par            50 yo returns for hyperthyroidism due to Graves' disease, diagnosed in December of 2011 and started on methimazole in January of 2012. \par            He got dose of methimazole down to 2.5 mg alternating with 5 mg, but then TFTs went back up. \par            Last here in May and is on methimazole 5 mg daily.\par            Most recent blood tests ([facility]) on\par            7/29/2016 included\par            TSH 0.25 ***\par            T3 112\par            T4 8. \par            .\par            He feels well.\par            .\par            Impression: Not going into remission and not likely to. He is well aware of this, but prefers to stay on methimazole. He understands the risks of methimazole and the risks of thyroid overactivity. He understands that I-131 and surgery are definitive options to treat the thyroid overactivity.\par            .\par            Plan: As per his wishes, continue methimazole 5 mg daily, but take two pills on Sundays. Updated TFTs, LFTs, CBC within 8 weeks. Thank you. -jh\par            .\par            ==\par            .\par            May 10, 2016\par            .\par            PCP: Dr. Lilly fax [phone]\par            .\par            CC: Hyperthyroid\par             12/19/2011 -diagnosis\par             1/2012 - started methimazole\par            .Feels well on methimazole 5 mg daily.\par            .\par            Impression: Not going into remission.\par            Not interested in I-131 or surgery.\par            Aware of risks of methimazole\par            Suppressed TSH indicates he is still mildly hypethyroid. \par            .\par            Plan: Updated TFTs before next visit in July. \par            .\par            ==\par            .\par            January 27, 2016\par            .\par            PCP: Dr. Lilly fax [phone]\par            .\par            CC: Hyperthyroid\par             12/19/2011 -diagnosis\par             1/2012 - started methimazole\par            .\par            He has been noting occasional palpitations,\par            occasional shakiness.\par            .\par            Plan: Increase methimazole to 5 mg daily for now and repeat TFTs in 10 weeks. He is not yet interested in I-131\par            ROV in 11 weeks. -jh\par            .\par            ==\par            .\par            July 24, 2015\par            .\par            PCP: Dr. Lilly fax [phone]\par            .\par            CC: Hyperthyroid\par             12/19/2011 -diagnosis\par             1/2012 - started methimazole\par            .\par             Remains on methimazole 5 mg daily.\par            TFTs in good range.\par            Plan: Decrease methimazole to 2.5 mg alternating with 5 mg and\par            ROV after next TFTs in a few months\par            .\par            ==\par            .\par            April 3, 2015\par            .\par            PCP: Dr. Lilly fax 1 [phone]\par            .\par            CC: Hyperthyroid\par             12/19/2011 -diagnosis\par             1/2012 - started methimazole\par            .\par            Today he reports he feels well \par            .\par            Imp: On methimazole 10 alt with 5.\par            .\par            Plan: 5 mg daily.\par            ROV in July.\par            .\par            ==\par            Dec 19, 2014\par            PCP: Dr. Lilly fax [phone]\par            CC: Hyperthyroid\par             12/19/2011 -diagnosis\par             1/2012 - started methimazole\par            .\par            Currently taking methimazole 10 mg alt with 5 mg last vist, dec from\par            10 mg daily.\par            Recenr TSI elev at 250 (< 140%)\par            T4 7.9 TSH 1.9\par            .\par            Impression: On methimazole for almost 3 years - not in remission but hyperthyroidism is controlled on 10 mg al with 5. TSI still elevated, also against chances of remission. \par            .\par            Plan: I-131 offered and declined. Same Rx. andn ROV in 3 1/2 months after labs.\par            .\par            ===\par            Sept 19, 2014\par            PCP: Dr. Lilly\par            CC: Hyperthyroid\par             12/19/2011 - Thyroid scan 46% update\par             TSI and TBII both elevated.\par             1/2012 - started methimazole\par            .\par            Currently on methimazole 10 mg daily. \par            In Feb 2014, TSH suppressed on 5 mg/day.\par            .\par            Recent TFTs are within normal (on 10 mg daily.\par            Sonogram Nov 2012 - normal.\par            .\par            Impression: As he did not go into a remission after two years of methimazole, the probability of a long term remission at this point is much decreased. However, there is room to lower the dose of methimazole from 10 mg daily.\par            .\par            Plan: Decrease to 10 mg alt with 5 mg, repeat labs in Nov and ROV Dec.\par

## 2022-08-19 NOTE — HISTORY OF PRESENT ILLNESS
[FreeTextEntry1] : Aug 12, 2022    iPhone\par \par PCP: Dr. Lilly fax 3 \par           Onc:  Dr. Luis Alberto Love p   fax [phone]              \par            .\par            CC: Hyperthyroid  12/19/2011 -diagnosis (RAIU 46 %, TSI and TBII elevated)\par             u/s at MRI 11/2012 and 2/2013: simple goiter\par               1/2012 - started methimazole\par             Elevated BMI (but losing weight)\par \par             Elevated PC BS \par             Lung cancer\par \par Taking methimazole 10 mg x 3 daily.\par \par Imp:  Severely hypothyroid (even so says:  "But I feel fine")\par Severely non-compliant (I discusssed with his sister)\par \par Plan:  Continue methimazole and start levothyroxine 125 mcg daily.\par ROV in about 6 weeks after labs.  \par \par \par Aug 07, 2022      iPhone\par \par PCP: Dr. Lilly fax 8 \par           Onc:  Dr. Luis Alberto Love p   fax [phone]              \par            .\par            CC: Hyperthyroid  12/19/2011 -diagnosis (RAIU 46 %, TSI and TBII elevated)\par             u/s at MRI 11/2012 and 2/2013: simple goiter\par               1/2012 - started methimazole\par             Elevated BMI (but losing weight)\par \par             Elevated PC BS \par             Lung cancer\par \par Taking methimazole 10 mg x 3 daily.\par No recent lab tests.\par Sets up visit because he needs revills.\par Reports, as usual, "I feel great."\par \par Imp/Plan:   Discussed compliance with patient and his sister.\par Long strategy session.  \par He has other complicated medical issues. \par \par \par Mar 07, 2022      iPhone\par \par PCP: Dr. Lilly fax 5 \par           Onc:  Dr. Luis Alberto Love p   fax [phone]              \par            .\par            CC: Hyperthyroid  12/19/2011 -diagnosis (RAIU 46 %, TSI and TBII elevated)\par             u/s at MRI 11/2012 and 2/2013: simple goiter\par               1/2012 - started methimazole\par             Elevated BMI (but losing weight)\par \par             Elevated PC BS \par             Lung cancer\par \par Taking methimazole 10 mg x 3 tabs a day \par \par Done with chemotherapy and now on immunotherapy alone.\par Recent [facility] labs show big improvement in thyroid overactivity.\par \par Plan:  Decrease methimazole to 10 mg two daily from three daily and repeat labs before next vitis.  \par \par \par Feb 14, 2022     [employer]\par \par PCP: Dr. Lilly fax 7 \par           Onc:  Dr. Luis Alberto Love p   fax [phone]              \par            .\par            CC: Hyperthyroid  12/19/2011 -diagnosis (RAIU 46 %, TSI and TBII elevated)\par             u/s at MRI 11/2012 and 2/2013: simple goiter\par               1/2012 - started methimazole\par             Elevated BMI (but losing weight)\par \par             Elevated PC BS \par             Lung cancer\par \par Taking methimazole 10 mg x 3 tabs a day \par [facility] labs from 1/21/2022 included\par creatinine 0.64\par TSH 0.03\par T4 16.3\par T3 231\par WBC 2.5\par Hct 22.1\par polys 943\par lymphs 1270   \par plats 61\par \par \par   2/10/2022  [facility] labs:\par \par BS 98\par creatinine 0.61\par A1c 4.3 \par albumin 3.2\par TSH < 0.01\par T4 18\par T3 401\par Hct 26.3\par B12 656\par folate 8.1 \par vit D 32 \par \par After 4 rounds of chemo, he was put on a holiday.\par Started on iron.\par \par Impression:  Mr. Ulloa rarely takes his thyroid medicaton because he feels well.\par Plan:  I strongly encouraged him to take the methimazole religiously and to have a follow up visit 3/7/2022.\par after updated labs.     \par \par \par \par Jan 24, 2022     iPhone\par \par PCP: Dr. Lilly fax 9 \par           Onc:  Dr. Sahu [facility] p   fax [phone]         io      \par            .\par            CC: Hyperthyroid  12/19/2011 -diagnosis (RAIU 46 %, TSI and TBII elevated)\par             u/s at MRI 11/2012 and 2/2013: simple goiter\par               1/2012 - started methimazole\par             Elevated BMI (but losing weight)\par \par             Elevated PC BS \par             Lung cancer\par \par Taking methimazole 10 mg x 3 tabs a day \par [facility] labs from 1/21/2022 include\par creatinine 0.64\par TSH 0.03\par T4 16.3\par T3 231\par WBC 2.5\par Hct 22.1\par polys 943\par lymphs 1270   \par plats 61\par \par Impression:  Tolerating the hyperthyroidism without symptoms.\par Taking methimazole 30 mg daily.\par Says he is going for PET-CT soon.\par \par Plan:  Same Rx for now.\par I will check with Dr. Lvoe regarding his perspective on the CBC.  [done]\par ROV Feb 14 after labs.\par \par \par \par \par \par Tu 10, 2022     iPhone\par \par PCP: Dr. Lilly fax 3 \par            .\par            CC: Hyperthyroid  12/19/2011 -diagnosis (RAIU 46 %, TSI and TBII elevated)\par             u/s at MRI 11/2012 and 2/2013: simple goiter\par               1/2012 - started methimazole\par             Elevated BMI (but losing weight)\par \par             Elevated PC BS \par             Lung cancer\par \par 58 yo called today to request [facility] form and said he would make appointment after that.   Wife told the  that he could not do a telephone visit because he was in the hospital.\par Did not add up:     So by 5:00 pm today we did a videochat.\par I had provided his records to Endocrine team when he was in [facility].\par He is currently taking methimazole 10 mg TID.\par \par Says he feels well.\par \par Plan:   He will ask his oncologist to fax update on current status.\par I have emailed [facility] for to Mr. Ulloa and he will call me one week later to discuss methimazole dose\par and I will again advise I-131.    \par \par \par \par 11/4/ 2021     \par \par Mr. Ulloa calls to report that after July visit, he saw ENT, Dr. Andres for scratchy throat\par then went for CT+ chest, told of mass, biopsy positive for small cell lung cancer and\par he is now seeing oncologist, Dr. Victorino Che at [facility] and will start chemotherapy this week and\par expects that to be followed by radiation (w/o surgery).\par He has no recent TFTs.\par Last T4 elevated.\par He has a [facility] form.\par Agrees to go to [facility] tomorrow and to call me a few days later.  -jh\par \par \par \par \par \par Jul 09, 2021    iPhone \par \par PCP: Dr. Lilly fax 1 [phone]\par            .\par            CC: Hyperthyroid  12/19/2011 -diagnosis (RAIU 46 %, TSI and TBII elevated)\par             u/s at MRI 11/2012 and 2/2013: simple goiter\par               1/2012 - started methimazole\par             Elevated BMI (but losing weight)\par \par             Elevated PC BS \par \par Has a problem with sensor in his care.\par Because of that, he could not come in toay and did not have a chance to stop by [facility] for \par lab tests but says he will go to [facility] tomorrow.  \par \par Last labs showed T4 18.\par He is supposed to see ENT for scratchy voice\par Went for US thyroid at [facility]:  "Heterogeneous thyroid with ill defined nodularity....No discrete nodules.  The appearance is similar to the prior study of February 2013..."\par \par Currently taking methimazole 10 mg x 3.\par Will go to [facility], call me a few days later.\par He made appt to see me END of Nov.\par Today I indicated he also needs Nov 2.  \par \par \par Feb 24, 2021     iPhone  \par \par PCP: Dr. Lilly fax 4 \par            .\par            CC: Hyperthyroid  12/19/2011 -diagnosis (RAIU 46 %, TSI and TBII elevated)\par             u/s at MRI 11/2012 and 2/2013: simple goiter\par               1/2012 - started methimazole\par             Elevated BMI (but losing weight)\par \par             Elevated PC BS \par \par 55 yo  will be going to [facility] for today or tomorrow.\par Most recent labs ([facility]) from November 20 included\par glucose 100 mg/dl\par T4 13\par T3  187 \par TSH < 0.01\par A1c 5.9 %\par u/a  urobilinogen 3 (<2)\par \par            .\par Impression:  Hyperthyroid d/t Graves diseease.\par Plan:  Updated labs - he said he will go to [facility] in next day or so and then call me on weekend for resutls\par ROV by erarly May\par          \par \par \par \par Nov 20, 2020     in person\par \par PCP: Dr. Lilly fax 0 \par            .\par            CC: Hyperthyroid  12/19/2011 -diagnosis (RAIU 46 %, TSI and TBII elevated)\par             u/s at MRI 11/2012 and 2/2013: simple goiter\par               1/2012 - started methimazole\par             Elevated BMI (but losing weight)\par \par             Elevated PC BS \par            .\par          \par Has methimazole 5 mg tablets, x 4 a day (total of 20 mg).    Ran out a few days ago.\par Sept 4, 2020 T3   222 ()    T4  13    TSH undetectable  \par \par Examination: \par Constitutional:  Alert, well nourished, healthy appearance, no acute distress \par Eyes:  No proptosis, no lid lag\par Thyroid:\par Pulmonary:  No respiratory distress, no accessory muscle use; normal rate and effort\par Cardiac:  Normal rate\par Vascular: \par Endocrine:  No stigmata of Cushing’s Syndrome\par Musculoskeletal:  Normal gait, no involuntary movements\par Neurology:  No tremors\par Affect/Mood/Psych:  Oriented x 3; normal affect, normal insight/judgement, normal mood \par .\par \par Impression: TBII elevated, TFTs eleevated despite methimazole.\par Plan:  advised I-131 or thyroid surgery.    He prefers the methimazole for now.  \par \par \par Sep 03, 2020     in person\par \par PCP: Dr. Lilly fax 1 [phone]\par            .\par            CC: Hyperthyroid  12/19/2011 -diagnosis (RAIU 46 %, TSI and TBII elevated)\par             u/s at MRI 11/2012 and 2/2013: simple goiter\par               1/2012 - started methimazole\par             Elevated BMI (but losing weight)\par \par             Elevated PC BS \par            .\par            Taking methimazole 5 mg tablets, three daily.\par \par \par Mar 24, 2020\par \par PCP: Dr. Lilly fax [phone]\par            .\par            CC: Hyperthyroid  12/19/2011 -diagnosis (RAIU 46 %, TSI and TBII elevated)\par             u/s at MRI 11/2012 and 2/2013: simple goiter\par               1/2012 - started methimazole\par             Elevated BMI (but losing weight)\par \par             Elevated PC BS \par            .\par            Taking methimazole 5 mg tablets, three daily.\par \par Impression:  TSh suppressed, T4 and T3 both elevated as is TSI.\par He is not interested in I-131 or thyroid surgery.  In fact, it is challenging to convince to stay on the\par methimazole since he feels so well.  \par \par Plan:  Increase methimazole from 15 mg a day to 20 mg/day. \par \par \par \par Oct 02, 2019\par \par PCP: Dr. Lilly fax [phone]\par            .\par            CC: Hyperthyroid\par             12/19/2011 -diagnosis (RAIU 46 %, TSI and TBII elevated)\par             u/s at MRI 11/2012 and 2/2013: simple goiter\par  1/2012 - started methimazole\par             Elevated BMI (but losing weight)\par            .\par          Taking methimazole 5 mg, one daily alternating with two daily.\par \par Now taking methimazole two 5 mg daily to [place] [facility].  \par Went for labs yesterday....not back.\par \par Plan:  Same Rx and labs before January visit.  \par \par \par \par \par \par May 24, 2019\par    PCP: Dr. Lilly fax [phone]\par            .\par            CC: Hyperthyroid\par             12/19/2011 -diagnosis (RAIU 46 %, TSI and TBII elevated)\par             u/s at MRI 11/2012 and 2/2013: simple goiter\par  1/2012 - started methimazole\par             Elevated BMI (but losing weight)\par            .\par          Taking methimazole 5 mg, one daily alternating with two daily.\par \par \par Today I reminded him of the potential side effects of methimazole including rash, hives, arthritis, hepatitis, agranulocytosis and death.    He prefers to take methimazole rather than the know safe option of I-131 or even surgery.\par So,he will increase the methimazole to 5 mg, two tablets daily, repeat labs before next visit in August.  \par As he reports:    "I feel great".  \par \par \par January 15, 2019\par \par \par \par \par Taking methimazole 5 mg daily.\par Recent labs at [facility] show suppressed TSH.\par Plan:  Increase methiamzole to 5 mg tablets:  one on odd days, two on even days.\par Repeat labs at [facility] before next visit in April.  \par To [facility] in [place] for refills.  \par \par Previous notes from [employer] appended below.\par \par  Feb 1, 2018\par            .\par            PCP: Dr. Lilly fax [phone]\par            .\par            CC: Hyperthyroid\par             12/19/2011 -diagnosis (RAIU 46 %, TSI and TBII elevated)\par             u/s at MRI 11/2012 and 2/2013: simple goiter\par             1/2012 - started methimazole\par             Elevated BMI (but losing weight)\par            .\par            Taking methimazole 5 mg x 8 tabs a week.\par            1/27/2018\par            TSH 0.49\par             (<140)\par            .\par            Impression: Doing well.\par            Declines I-131\par            .\par            Pllan: Same Rx\par            Warned of potential side effects of methimazole. \par            .\par            .\par            ==\par            .\par            Oct 4, 2017\par            .\par            PCP: Dr. Lilly fax [phone]\par            .\par            CC: Hyperthyroid\par             12/19/2011 -diagnosis (RAIU 46 %, TSI and TBII elevated)\par             u/s at MRI 11/2012 and 2/2013: simple goiter\par             1/2012 - started methimazole\par             Elevated BMI (but losing weight)\par            .\par            Taking methimazole 5 mg x 9 tabs a week.\par            TFTs in good range.\par            .\par            OGTT completely normal.\par            .\par            Has been exercise and has lost weight. \par            .\par            Plan: Dec methimazole to 8 ills a week \par            ROV in January.\par            .\par            ==\par            .\par            June 21, 2017\par            .\par            PCP: Dr. Lilly fax [phone]\par            .\par            CC: Hyperthyroid\par             12/19/2011 -diagnosis (RAIU 46 %, TSI and TBII elevated)\par             u/s at MRI 11/2012 and 2/2013: simple goiter\par             1/2012 - started methimazole\par             Elevated BMI\par            .\par             Smoker\par             A1c 6.0\par             Decreased HDL/Elevated LDL\par            .\par            Taking methimazole 5 mg tablets.\par            Because of lowish TSH on 8 tablets a week, at last visit in January, dose incresed to 9 tablets a week.\par            .\par            Recent labs show TSH in normal range.\par            .\par            Impression: Hyperthyroidism well controlled on methimazole 5 mg tablets, 9 tablets a week. Has not gone into remission despite treatment with methimazole in early 2012 and thus he is not likely to go into a remission. He is awasre that his family history, elevted BMI, elevated LDL, lowe HDL are all cardiac risk factors and will discuss further with Dr. Lilly. \par            .\par            Plan: Same Rx. Formal 2 hour OGTT. \par            .\par            .\par            ==\par            .\par            January 30, 2017\par            .\par            PCP: Dr. Lilly fax [phone]\par            .\par            CC: Hyperthyroid\par             12/19/2011 -diagnosis (RAIU 46 %, TSI and TBII elevated)\par             u/s at MRI 11/2012 and 2/2013: simple goiter\par             1/2012 - started methimazole\par             Elevated BMI\par            .\par             Smoker\par            .\par            Last visit TSH low on methimazole 5 mg x 8 tabs/week so\par            dose inc to 5 mg x 9 tabs/week and\par            1/26/2017 TFTs show TSH 1.3\par            \par            HDL 31 \par            .\par            Impression: Thyroi controlled on 9 tabls methimazole a week.\par            .\par            Plan: methiamzole 5 mg x 9 tabs aweek.\par            ROV 3 months. - \par            .\par            ==\par            .\par            October 18, 2016\par            .\par            PCP: Dr. Lilly fax [phone]\par            .\par            CC: Hyperthyroid\par             12/19/2011 -diagnosis (RAIU 46 %, TSI and TBII elevated)\par             u/s at MRI 11/2012 and 2/2013: simple goiter\par             1/2012 - started methimazole\par             Elevated BMI\par            .\par             Smoker\par            .\par            Doing well on methimazole 5 mg, two on Sunday.\par            TSH is slightly low.\par            .\par            Plan: methimazole 5 mg 8 a week to 9 a week.\par            ROV -\par            .\par            ==\par            .\par            August 1, 2016\par            .\par            PCP: Dr. Lilly fax [phone]\par            .\par            CC: Hyperthyroid\par             12/19/2011 -diagnosis (RAIU 46 %, TSI and TBII elevated)\par             u/s at MRI 11/2012 and 2/2013: simple goiter\par             1/2012 - started methimazole\par             Elevated BMI\par            .\par            50 yo returns for hyperthyroidism due to Graves' disease, diagnosed in December of 2011 and started on methimazole in January of 2012. \par            He got dose of methimazole down to 2.5 mg alternating with 5 mg, but then TFTs went back up. \par            Last here in May and is on methimazole 5 mg daily.\par            Most recent blood tests ([facility]) on\par            7/29/2016 included\par            TSH 0.25 ***\par            T3 112\par            T4 8. \par            .\par            He feels well.\par            .\par            Impression: Not going into remission and not likely to. He is well aware of this, but prefers to stay on methimazole. He understands the risks of methimazole and the risks of thyroid overactivity. He understands that I-131 and surgery are definitive options to treat the thyroid overactivity.\par            .\par            Plan: As per his wishes, continue methimazole 5 mg daily, but take two pills on Sundays. Updated TFTs, LFTs, CBC within 8 weeks. Thank you. -jh\par            .\par            ==\par            .\par            May 10, 2016\par            .\par            PCP: Dr. Lilly fax 1 [phone]\par            .\par            CC: Hyperthyroid\par             12/19/2011 -diagnosis\par             1/2012 - started methimazole\par            .Feels well on methimazole 5 mg daily.\par            .\par            Impression: Not going into remission.\par            Not interested in I-131 or surgery.\par            Aware of risks of methimazole\par            Suppressed TSH indicates he is still mildly hypethyroid. \par            .\par            Plan: Updated TFTs before next visit in July. \par            .\par            ==\par            .\par            January 27, 2016\par            .\par            PCP: Dr. Lilly fax 1 [phone]\par            .\par            CC: Hyperthyroid\par             12/19/2011 -diagnosis\par             1/2012 - started methimazole\par            .\par            He has been noting occasional palpitations,\par            occasional shakiness.\par            .\par            Plan: Increase methimazole to 5 mg daily for now and repeat TFTs in 10 weeks. He is not yet interested in I-131\par            ROV in 11 weeks. -jh\par            .\par            ==\par            .\par            July 24, 2015\par            .\par            PCP: Dr. Lilly fax [phone]\par            .\par            CC: Hyperthyroid\par             12/19/2011 -diagnosis\par             1/2012 - started methimazole\par            .\par             Remains on methimazole 5 mg daily.\par            TFTs in good range.\par            Plan: Decrease methimazole to 2.5 mg alternating with 5 mg and\par            ROV after next TFTs in a few months\par            .\par            ==\par            .\par            April 3, 2015\par            .\par            PCP: Dr. Lilly fax [phone]\par            .\par            CC: Hyperthyroid\par             12/19/2011 -diagnosis\par             1/2012 - started methimazole\par            .\par            Today he reports he feels well \par            .\par            Imp: On methimazole 10 alt with 5.\par            .\par            Plan: 5 mg daily.\par            ROV in July.\par            .\par            ==\par            Dec 19, 2014\par            PCP: Dr. Lilly fax 1 [phone]\par            CC: Hyperthyroid\par             12/19/2011 -diagnosis\par             1/2012 - started methimazole\par            .\par            Currently taking methimazole 10 mg alt with 5 mg last vist, dec from\par            10 mg daily.\par            Recenr TSI elev at 250 (< 140%)\par            T4 7.9 TSH 1.9\par            .\par            Impression: On methimazole for almost 3 years - not in remission but hyperthyroidism is controlled on 10 mg al with 5. TSI still elevated, also against chances of remission. \par            .\par            Plan: I-131 offered and declined. Same Rx. andn ROV in 3 1/2 months after labs.\par            .\par            ===\par            Sept 19, 2014\par            PCP: Dr. Lilly\par            CC: Hyperthyroid\par             12/19/2011 - Thyroid scan 46% update\par             TSI and TBII both elevated.\par             1/2012 - started methimazole\par            .\par            Currently on methimazole 10 mg daily. \par            In Feb 2014, TSH suppressed on 5 mg/day.\par            .\par            Recent TFTs are within normal (on 10 mg daily.\par            Sonogram Nov 2012 - normal.\par            .\par            Impression: As he did not go into a remission after two years of methimazole, the probability of a long term remission at this point is much decreased. However, there is room to lower the dose of methimazole from 10 mg daily.\par            .\par            Plan: Decrease to 10 mg alt with 5 mg, repeat labs in Nov and ROV Dec.\par

## 2022-09-23 ENCOUNTER — APPOINTMENT (OUTPATIENT)
Dept: ENDOCRINOLOGY | Facility: CLINIC | Age: 57
End: 2022-09-23

## 2022-09-23 PROCEDURE — 99212 OFFICE O/P EST SF 10 MIN: CPT | Mod: 95

## 2022-09-23 NOTE — HISTORY OF PRESENT ILLNESS
[Home] : at home, [unfilled] , at the time of the visit. [Medical Office: ([facility])___] : at the medical office located in  [Verbal consent obtained from patient] : the patient, [unfilled] [FreeTextEntry1] : Sep 23, 2022     - Videochat using [employer] \par \par PCP: Dr. Lilly fax 5 \par           Onc:  Dr. Luis Alberto Love p   fax [phone]              \par            .\par            CC: Hyperthyroid  12/19/2011 -diagnosis (RAIU 46 %, TSI and TBII elevated)\par             u/s at MRI 11/2012 and 2/2013: simple goiter\par               1/2012 - started methimazole\par             Elevated BMI (but losing weight)\par \par             Elevated PC BS \par             Lung cancer\par \par Developed marked hypothyroidism on methimazole, so added levothyroxine.\par Taking methimazole 10 mg x 2 in AM plus levothyroxine 125 mcg.\par "I feel great."  \par \par [facility] labs shows TSI and TBII still elevated.  \par \par Impression:   Hyperthyroidism due to Graves' Disease.   \par Plan:  Same Rx.    He has no follow up appointments scheduled.   He will set up 3 visit.  \par \par \par Aug 12, 2022    iPhone\par \par PCP: Dr. Lilly fax 5 \par           Onc:  Dr. Luis Alberto Love p   fax [phone]              \par            .\par            CC: Hyperthyroid  12/19/2011 -diagnosis (RAIU 46 %, TSI and TBII elevated)\par             u/s at MRI 11/2012 and 2/2013: simple goiter\par               1/2012 - started methimazole\par             Elevated BMI (but losing weight)\par \par             Elevated PC BS \par             Lung cancer\par \par Taking methimazole 10 mg x 3 daily.\par \par Imp:  Severely hypothyroid (even so says:  "But I feel fine")\par Severely non-compliant (I discusssed with his sister)\par \par Plan:  Continue methimazole and start levothyroxine 125 mcg daily.\par ROV in about 6 weeks after labs.  \par \par \par Aug 07, 2022      iPhone\par \par PCP: Dr. iLlly fax 5 \par           Onc:  Dr. Luis Alberto Love p   fax [phone]              \par            .\par            CC: Hyperthyroid  12/19/2011 -diagnosis (RAIU 46 %, TSI and TBII elevated)\par             u/s at MRI 11/2012 and 2/2013: simple goiter\par               1/2012 - started methimazole\par             Elevated BMI (but losing weight)\par \par             Elevated PC BS \par             Lung cancer\par \par Taking methimazole 10 mg x 3 daily.\par No recent lab tests.\par Sets up visit because he needs revills.\par Reports, as usual, "I feel great."\par \par Imp/Plan:   Discussed compliance with patient and his sister.\par Long strategy session.  \par He has other complicated medical issues. \par \par \par Mar 07, 2022      iPhone\par \par PCP: Dr. Lilly fax 2 \par           Onc:  Dr. Luis Alberto Love p   fax [phone]              \par            .\par            CC: Hyperthyroid  12/19/2011 -diagnosis (RAIU 46 %, TSI and TBII elevated)\par             u/s at MRI 11/2012 and 2/2013: simple goiter\par               1/2012 - started methimazole\par             Elevated BMI (but losing weight)\par \par             Elevated PC BS \par             Lung cancer\par \par Taking methimazole 10 mg x 3 tabs a day \par \par Done with chemotherapy and now on immunotherapy alone.\par Recent [facility] labs show big improvement in thyroid overactivity.\par \par Plan:  Decrease methimazole to 10 mg two daily from three daily and repeat labs before next vitis.  \par \par \par Feb 14, 2022     [employer]\par \par PCP: Dr. Lilly fax 0 \par           Onc:  Dr. Luis Alberto Love p   fax [phone]              \par            .\par            CC: Hyperthyroid  12/19/2011 -diagnosis (RAIU 46 %, TSI and TBII elevated)\par             u/s at MRI 11/2012 and 2/2013: simple goiter\par               1/2012 - started methimazole\par             Elevated BMI (but losing weight)\par \par             Elevated PC BS \par             Lung cancer\par \par Taking methimazole 10 mg x 3 tabs a day \par [facility] labs from 1/21/2022 included\par creatinine 0.64\par TSH 0.03\par T4 16.3\par T3 231\par WBC 2.5\par Hct 22.1\par polys 943\par lymphs 1270   \par plats 61\par \par \par   2/10/2022  [facility] labs:\par \par BS 98\par creatinine 0.61\par A1c 4.3 \par albumin 3.2\par TSH < 0.01\par T4 18\par T3 401\par Hct 26.3\par B12 656\par folate 8.1 \par vit D 32 \par \par After 4 rounds of chemo, he was put on a holiday.\par Started on iron.\par \par Impression:  Mr. Ulloa rarely takes his thyroid medicaton because he feels well.\par Plan:  I strongly encouraged him to take the methimazole religiously and to have a follow up visit 3/7/2022.\par after updated labs.     \par \par \par \par Jan 24, 2022     iPhone\par \par PCP: Dr. Lilly fax 1 \par           Onc:  Dr. Sahu [facility] p   fax [phone]         io      \par            .\par            CC: Hyperthyroid  12/19/2011 -diagnosis (RAIU 46 %, TSI and TBII elevated)\par             u/s at MRI 11/2012 and 2/2013: simple goiter\par               1/2012 - started methimazole\par             Elevated BMI (but losing weight)\par \par             Elevated PC BS \par             Lung cancer\par \par Taking methimazole 10 mg x 3 tabs a day \par [facility] labs from 1/21/2022 include\par creatinine 0.64\par TSH 0.03\par T4 16.3\par T3 231\par WBC 2.5\par Hct 22.1\par polys 943\par lymphs 1270   \par plats 61\par \par Impression:  Tolerating the hyperthyroidism without symptoms.\par Taking methimazole 30 mg daily.\par Says he is going for PET-CT soon.\par \par Plan:  Same Rx for now.\par I will check with Dr. Love regarding his perspective on the CBC.  [done]\par ROV Feb 14 after labs.\par \par \par \par \par \par Tu 10, 2022     iPhone\par \par PCP: Dr. Lilly fax 7 \par            .\par            CC: Hyperthyroid  12/19/2011 -diagnosis (RAIU 46 %, TSI and TBII elevated)\par             u/s at MRI 11/2012 and 2/2013: simple goiter\par               1/2012 - started methimazole\par             Elevated BMI (but losing weight)\par \par             Elevated PC BS \par             Lung cancer\par \par 58 yo called today to request [facility] form and said he would make appointment after that.   Wife told the  that he could not do a telephone visit because he was in the hospital.\par Did not add up:     So by 5:00 pm today we did a videochat.\par I had provided his records to Endocrine team when he was in [facility].\par He is currently taking methimazole 10 mg TID.\par \par Says he feels well.\par \par Plan:   He will ask his oncologist to fax update on current status.\par I have emailed [facility] for to Mr. Ulloa and he will call me one week later to discuss methimazole dose\par and I will again advise I-131.    \par \par \par \par 11/4/ 2021     \par \par Mr. Ulloa calls to report that after July visit, he saw ENT, Dr. Andres for scratchy throat\par then went for CT+ chest, told of mass, biopsy positive for small cell lung cancer and\par he is now seeing oncologist, Dr. Victorino Che at [facility] and will start chemotherapy this week and\par expects that to be followed by radiation (w/o surgery).\par He has no recent TFTs.\par Last T4 elevated.\par He has a [facility] form.\par Agrees to go to [facility] tomorrow and to call me a few days later.  -jh\par \par \par \par \par \par Jul 09, 2021    iPhone \par \par PCP: Dr. Lilly fax 8 \par            .\par            CC: Hyperthyroid  12/19/2011 -diagnosis (RAIU 46 %, TSI and TBII elevated)\par             u/s at MRI 11/2012 and 2/2013: simple goiter\par               1/2012 - started methimazole\par             Elevated BMI (but losing weight)\par \par             Elevated PC BS \par \par Has a problem with sensor in his care.\par Because of that, he could not come in toay and did not have a chance to stop by [facility] for \par lab tests but says he will go to [facility] tomorrow.  \par \par Last labs showed T4 18.\par He is supposed to see ENT for scratchy voice\par Went for US thyroid at [facility]:  "Heterogeneous thyroid with ill defined nodularity....No discrete nodules.  The appearance is similar to the prior study of February 2013..."\par \par Currently taking methimazole 10 mg x 3.\par Will go to [facility], call me a few days later.\par He made appt to see me END of Nov.\par Today I indicated he also needs Nov 2.  \par \par \par Feb 24, 2021     iPhone  \par \par PCP: Dr. Lilly fax 0 \par            .\par            CC: Hyperthyroid  12/19/2011 -diagnosis (RAIU 46 %, TSI and TBII elevated)\par             u/s at MRI 11/2012 and 2/2013: simple goiter\par               1/2012 - started methimazole\par             Elevated BMI (but losing weight)\par \par             Elevated PC BS \par \par 55 yo  will be going to [facility] for today or tomorrow.\par Most recent labs ([facility]) from November 20 included\par glucose 100 mg/dl\par T4 13\par T3  187 \par TSH < 0.01\par A1c 5.9 %\par u/a  urobilinogen 3 (<2)\par \par            .\par Impression:  Hyperthyroid d/t Graves diseease.\par Plan:  Updated labs - he said he will go to [facility] in next day or so and then call me on weekend for resutls\par ROV by erarly May\par          \par \par \par \par Nov 20, 2020     in person\par \par PCP: Dr. Lilly fax 2 \par            .\par            CC: Hyperthyroid  12/19/2011 -diagnosis (RAIU 46 %, TSI and TBII elevated)\par             u/s at MRI 11/2012 and 2/2013: simple goiter\par               1/2012 - started methimazole\par             Elevated BMI (but losing weight)\par \par             Elevated PC BS \par            .\par          \par Has methimazole 5 mg tablets, x 4 a day (total of 20 mg).    Ran out a few days ago.\par Sept 4, 2020 T3   222 ()    T4  13    TSH undetectable  \par \par Examination: \par Constitutional:  Alert, well nourished, healthy appearance, no acute distress \par Eyes:  No proptosis, no lid lag\par Thyroid:\par Pulmonary:  No respiratory distress, no accessory muscle use; normal rate and effort\par Cardiac:  Normal rate\par Vascular: \par Endocrine:  No stigmata of Cushing’s Syndrome\par Musculoskeletal:  Normal gait, no involuntary movements\par Neurology:  No tremors\par Affect/Mood/Psych:  Oriented x 3; normal affect, normal insight/judgement, normal mood \par .\par \par Impression: TBII elevated, TFTs eleevated despite methimazole.\par Plan:  advised I-131 or thyroid surgery.    He prefers the methimazole for now.  \par \par \par Sep 03, 2020     in person\par \par PCP: Dr. Lilly fax 1 [phone]\par            .\par            CC: Hyperthyroid  12/19/2011 -diagnosis (RAIU 46 %, TSI and TBII elevated)\par             u/s at MRI 11/2012 and 2/2013: simple goiter\par               1/2012 - started methimazole\par             Elevated BMI (but losing weight)\par \par             Elevated PC BS \par            .\par            Taking methimazole 5 mg tablets, three daily.\par \par \par Mar 24, 2020\par \par PCP: Dr. Lilly fax [phone]\par            .\par            CC: Hyperthyroid  12/19/2011 -diagnosis (RAIU 46 %, TSI and TBII elevated)\par             u/s at MRI 11/2012 and 2/2013: simple goiter\par               1/2012 - started methimazole\par             Elevated BMI (but losing weight)\par \par             Elevated PC BS \par            .\par            Taking methimazole 5 mg tablets, three daily.\par \par Impression:  TSh suppressed, T4 and T3 both elevated as is TSI.\par He is not interested in I-131 or thyroid surgery.  In fact, it is challenging to convince to stay on the\par methimazole since he feels so well.  \par \par Plan:  Increase methimazole from 15 mg a day to 20 mg/day. \par \par \par \par Oct 02, 2019\par \par PCP: Dr. iLlly fax [phone]\par            .\par            CC: Hyperthyroid\par             12/19/2011 -diagnosis (RAIU 46 %, TSI and TBII elevated)\par             u/s at MRI 11/2012 and 2/2013: simple goiter\par  1/2012 - started methimazole\par             Elevated BMI (but losing weight)\par            .\par          Taking methimazole 5 mg, one daily alternating with two daily.\par \par Now taking methimazole two 5 mg daily to [place] [facility].  \par Went for labs yesterday....not back.\par \par Plan:  Same Rx and labs before January visit.  \par \par \par \par \par \par May 24, 2019\par    PCP: Dr. Lilly fax [phone]\par            .\par            CC: Hyperthyroid\par             12/19/2011 -diagnosis (RAIU 46 %, TSI and TBII elevated)\par             u/s at MRI 11/2012 and 2/2013: simple goiter\par  1/2012 - started methimazole\par             Elevated BMI (but losing weight)\par            .\par          Taking methimazole 5 mg, one daily alternating with two daily.\par \par \par Today I reminded him of the potential side effects of methimazole including rash, hives, arthritis, hepatitis, agranulocytosis and death.    He prefers to take methimazole rather than the know safe option of I-131 or even surgery.\par So,he will increase the methimazole to 5 mg, two tablets daily, repeat labs before next visit in August.  \par As he reports:    "I feel great".  \par \par \par January 15, 2019\par \par \par \par \par Taking methimazole 5 mg daily.\par Recent labs at [facility] show suppressed TSH.\par Plan:  Increase methiamzole to 5 mg tablets:  one on odd days, two on even days.\par Repeat labs at [facility] before next visit in April.  \par To [facility] in [place] for refills.  \par \par Previous notes from [employer] appended below.\par \par  Feb 1, 2018\par            .\par            PCP: Dr. Lilly fax [phone]\par            .\par            CC: Hyperthyroid\par             12/19/2011 -diagnosis (RAIU 46 %, TSI and TBII elevated)\par             u/s at MRI 11/2012 and 2/2013: simple goiter\par             1/2012 - started methimazole\par             Elevated BMI (but losing weight)\par            .\par            Taking methimazole 5 mg x 8 tabs a week.\par            1/27/2018\par            TSH 0.49\par             (<140)\par            .\par            Impression: Doing well.\par            Declines I-131\par            .\par            Pllan: Same Rx\par            Warned of potential side effects of methimazole. \par            .\par            .\par            ==\par            .\par            Oct 4, 2017\par            .\par            PCP: Dr. Lilly fax [phone]\par            .\par            CC: Hyperthyroid\par             12/19/2011 -diagnosis (RAIU 46 %, TSI and TBII elevated)\par             u/s at MRI 11/2012 and 2/2013: simple goiter\par             1/2012 - started methimazole\par             Elevated BMI (but losing weight)\par            .\par            Taking methimazole 5 mg x 9 tabs a week.\par            TFTs in good range.\par            .\par            OGTT completely normal.\par            .\par            Has been exercise and has lost weight. \par            .\par            Plan: Dec methimazole to 8 ills a week \par            ROV in January.\par            .\par            ==\par            .\par            June 21, 2017\par            .\par            PCP: Dr. Lilly fax [phone]\par            .\par            CC: Hyperthyroid\par             12/19/2011 -diagnosis (RAIU 46 %, TSI and TBII elevated)\par             u/s at MRI 11/2012 and 2/2013: simple goiter\par             1/2012 - started methimazole\par             Elevated BMI\par            .\par             Smoker\par             A1c 6.0\par             Decreased HDL/Elevated LDL\par            .\par            Taking methimazole 5 mg tablets.\par            Because of lowish TSH on 8 tablets a week, at last visit in January, dose incresed to 9 tablets a week.\par            .\par            Recent labs show TSH in normal range.\par            .\par            Impression: Hyperthyroidism well controlled on methimazole 5 mg tablets, 9 tablets a week. Has not gone into remission despite treatment with methimazole in early 2012 and thus he is not likely to go into a remission. He is awasre that his family history, elevted BMI, elevated LDL, lowe HDL are all cardiac risk factors and will discuss further with Dr. Lilly. \par            .\par            Plan: Same Rx. Formal 2 hour OGTT. \par            .\par            .\par            ==\par            .\par            January 30, 2017\par            .\par            PCP: Dr. Lilly fax [phone]\par            .\par            CC: Hyperthyroid\par             12/19/2011 -diagnosis (RAIU 46 %, TSI and TBII elevated)\par             u/s at MRI 11/2012 and 2/2013: simple goiter\par             1/2012 - started methimazole\par             Elevated BMI\par            .\par             Smoker\par            .\par            Last visit TSH low on methimazole 5 mg x 8 tabs/week so\par            dose inc to 5 mg x 9 tabs/week and\par            1/26/2017 TFTs show TSH 1.3\par            \par            HDL 31 \par            .\par            Impression: Thyroi controlled on 9 tabls methimazole a week.\par            .\par            Plan: methiamzole 5 mg x 9 tabs aweek.\par            ROV 3 months. - \par            .\par            ==\par            .\par            October 18, 2016\par            .\par            PCP: Dr. Lilly fax 1 [phone]\par            .\par            CC: Hyperthyroid\par             12/19/2011 -diagnosis (RAIU 46 %, TSI and TBII elevated)\par             u/s at MRI 11/2012 and 2/2013: simple goiter\par             1/2012 - started methimazole\par             Elevated BMI\par            .\par             Smoker\par            .\par            Doing well on methimazole 5 mg, two on Sunday.\par            TSH is slightly low.\par            .\par            Plan: methimazole 5 mg 8 a week to 9 a week.\par            ROV -\par            .\par            ==\par            .\par            August 1, 2016\par            .\par            PCP: Dr. Lilly fax 1 [phone]\par            .\par            CC: Hyperthyroid\par             12/19/2011 -diagnosis (RAIU 46 %, TSI and TBII elevated)\par             u/s at MRI 11/2012 and 2/2013: simple goiter\par             1/2012 - started methimazole\par             Elevated BMI\par            .\par            50 yo returns for hyperthyroidism due to Graves' disease, diagnosed in December of 2011 and started on methimazole in January of 2012. \par            He got dose of methimazole down to 2.5 mg alternating with 5 mg, but then TFTs went back up. \par            Last here in May and is on methimazole 5 mg daily.\par            Most recent blood tests ([facility]) on\par            7/29/2016 included\par            TSH 0.25 ***\par            T3 112\par            T4 8. \par            .\par            He feels well.\par            .\par            Impression: Not going into remission and not likely to. He is well aware of this, but prefers to stay on methimazole. He understands the risks of methimazole and the risks of thyroid overactivity. He understands that I-131 and surgery are definitive options to treat the thyroid overactivity.\par            .\par            Plan: As per his wishes, continue methimazole 5 mg daily, but take two pills on Sundays. Updated TFTs, LFTs, CBC within 8 weeks. Thank you. -jh\par            .\par            ==\par            .\par            May 10, 2016\par            .\par            PCP: Dr. Lilly fax 1 [phone]\par            .\par            CC: Hyperthyroid\par             12/19/2011 -diagnosis\par             1/2012 - started methimazole\par            .Feels well on methimazole 5 mg daily.\par            .\par            Impression: Not going into remission.\par            Not interested in I-131 or surgery.\par            Aware of risks of methimazole\par            Suppressed TSH indicates he is still mildly hypethyroid. \par            .\par            Plan: Updated TFTs before next visit in July. \par            .\par            ==\par            .\par            January 27, 2016\par            .\par            PCP: Dr. Lilly fax [phone]\par            .\par            CC: Hyperthyroid\par             12/19/2011 -diagnosis\par             1/2012 - started methimazole\par            .\par            He has been noting occasional palpitations,\par            occasional shakiness.\par            .\par            Plan: Increase methimazole to 5 mg daily for now and repeat TFTs in 10 weeks. He is not yet interested in I-131\par            ROV in 11 weeks. -jh\par            .\par            ==\par            .\par            July 24, 2015\par            .\par            PCP: Dr. Lilly fax [phone]\par            .\par            CC: Hyperthyroid\par             12/19/2011 -diagnosis\par             1/2012 - started methimazole\par            .\par             Remains on methimazole 5 mg daily.\par            TFTs in good range.\par            Plan: Decrease methimazole to 2.5 mg alternating with 5 mg and\par            ROV after next TFTs in a few months\par            .\par            ==\par            .\par            April 3, 2015\par            .\par            PCP: Dr. Lilly fax [phone]\par            .\par            CC: Hyperthyroid\par             12/19/2011 -diagnosis\par             1/2012 - started methimazole\par            .\par            Today he reports he feels well \par            .\par            Imp: On methimazole 10 alt with 5.\par            .\par            Plan: 5 mg daily.\par            ROV in July.\par            .\par            ==\par            Dec 19, 2014\par            PCP: Dr. Lilly fax 1 [phone]\par            CC: Hyperthyroid\par             12/19/2011 -diagnosis\par             1/2012 - started methimazole\par            .\par            Currently taking methimazole 10 mg alt with 5 mg last vist, dec from\par            10 mg daily.\par            Recenr TSI elev at 250 (< 140%)\par            T4 7.9 TSH 1.9\par            .\par            Impression: On methimazole for almost 3 years - not in remission but hyperthyroidism is controlled on 10 mg al with 5. TSI still elevated, also against chances of remission. \par            .\par            Plan: I-131 offered and declined. Same Rx. andn ROV in 3 1/2 months after labs.\par            .\par            ===\par            Sept 19, 2014\par            PCP: Dr. Lilly\par            CC: Hyperthyroid\par             12/19/2011 - Thyroid scan 46% update\par             TSI and TBII both elevated.\par             1/2012 - started methimazole\par            .\par            Currently on methimazole 10 mg daily. \par            In Feb 2014, TSH suppressed on 5 mg/day.\par            .\par            Recent TFTs are within normal (on 10 mg daily.\par            Sonogram Nov 2012 - normal.\par            .\par            Impression: As he did not go into a remission after two years of methimazole, the probability of a long term remission at this point is much decreased. However, there is room to lower the dose of methimazole from 10 mg daily.\par            .\par            Plan: Decrease to 10 mg alt with 5 mg, repeat labs in Nov and ROV Dec.\par

## 2023-03-06 RX ORDER — METHIMAZOLE 10 MG/1
10 TABLET ORAL
Qty: 60 | Refills: 3 | Status: ACTIVE | COMMUNITY
Start: 2019-01-15 | End: 1900-01-01

## 2023-04-03 ENCOUNTER — APPOINTMENT (OUTPATIENT)
Dept: ENDOCRINOLOGY | Facility: CLINIC | Age: 58
End: 2023-04-03
Payer: SELF-PAY

## 2023-04-03 PROCEDURE — 99214 OFFICE O/P EST MOD 30 MIN: CPT | Mod: 95

## 2023-04-03 NOTE — HISTORY OF PRESENT ILLNESS
[FreeTextEntry1] : Apr 03, 2023        - Videochat using [employer] \par \par PCP: Dr. Lilly fax 4 \par           Onc:  Dr. Luis Alberto Love p   fax [phone]     at [facility]         \par            .\par            CC: Hyperthyroid  12/19/2011 -diagnosis (RAIU 46 %, TSI and TBII elevated)\par             u/s at MRI 11/2012 and 2/2013: simple goiter\par               1/2012 - started methimazole\par             Elevated BMI (but losing weight)\par \par             Elevated PC BS \par             Lung cancer\par \par Developed marked hypothyroidism on methimazole, so added levothyroxine.\par Taking methimazole 10 mg x 2 in AM plus levothyroxine 125 mcg.\par "I feel great."  \par \par [facility] shows TSI and TBII still elevated.  \par \par Since last visit, developed rash on hands and feet and required steroid Rx by Dr. Love.  \par Completed a recent dosepack.   \par \par Lab tests at [facility] on 3/24/2023 included\par \par creat 1.2\par calcium 9.7\par TSH 14.17\par T4 5.6  \par \par Plan: Continue methimazole 10 mg x 2 daily in AM and\par increase the levothyroxine to 137 mcg daily and\par ROV in October after [facility] labs in September and also schedule\par visit for January 2024 (now)  \par \par \par Sep 23, 2022     - Videochat using [employer] \par \par PCP: Dr. Lilly fax 7 \par           Onc:  Dr. Luis Alberto Love p   fax [phone]              \par            .\par            CC: Hyperthyroid  12/19/2011 -diagnosis (RAIU 46 %, TSI and TBII elevated)\par             u/s at MRI 11/2012 and 2/2013: simple goiter\par               1/2012 - started methimazole\par             Elevated BMI (but losing weight)\par \par             Elevated PC BS \par             Lung cancer\par \par Developed marked hypothyroidism on methimazole, so added levothyroxine.\par Taking methimazole 10 mg x 2 in AM plus levothyroxine 125 mcg.\par "I feel great."  \par \par [facility] labs shows TSI and TBII still elevated.  \par \par Impression:   Hyperthyroidism due to Graves' Disease.   \par Plan:  Same Rx.    He has no follow up appointments scheduled.   He will set up 3 visit.  \par \par \par Aug 12, 2022    iPhone\par \par PCP: Dr. Lilly fax 4 \par           Onc:  Dr. Luis Alberto Love p   fax [phone]              \par            .\par            CC: Hyperthyroid  12/19/2011 -diagnosis (RAIU 46 %, TSI and TBII elevated)\par             u/s at MRI 11/2012 and 2/2013: simple goiter\par               1/2012 - started methimazole\par             Elevated BMI (but losing weight)\par \par             Elevated PC BS \par             Lung cancer\par \par Taking methimazole 10 mg x 3 daily.\par \par Imp:  Severely hypothyroid (even so says:  "But I feel fine")\par Severely non-compliant (I discusssed with his sister)\par \par Plan:  Continue methimazole and start levothyroxine 125 mcg daily.\par ROV in about 6 weeks after labs.  \par \par \par Aug 07, 2022      iPhone\par \par PCP: Dr. Lilly fax 9 \par           Onc:  Dr. Luis Alberto Love p   fax [phone]              \par            .\par            CC: Hyperthyroid  12/19/2011 -diagnosis (RAIU 46 %, TSI and TBII elevated)\par             u/s at MRI 11/2012 and 2/2013: simple goiter\par               1/2012 - started methimazole\par             Elevated BMI (but losing weight)\par \par             Elevated PC BS \par             Lung cancer\par \par Taking methimazole 10 mg x 3 daily.\par No recent lab tests.\par Sets up visit because he needs revills.\par Reports, as usual, "I feel great."\par \par Imp/Plan:   Discussed compliance with patient and his sister.\par Long strategy session.  \par He has other complicated medical issues. \par \par \par Mar 07, 2022      iPhone\par \par PCP: Dr. Lilly fax 8 \par           Onc:  Dr. Luis Alberto Love p   fax [phone]              \par            .\par            CC: Hyperthyroid  12/19/2011 -diagnosis (RAIU 46 %, TSI and TBII elevated)\par             u/s at MRI 11/2012 and 2/2013: simple goiter\par               1/2012 - started methimazole\par             Elevated BMI (but losing weight)\par \par             Elevated PC BS \par             Lung cancer\par \par Taking methimazole 10 mg x 3 tabs a day \par \par Done with chemotherapy and now on immunotherapy alone.\par Recent [facility] labs show big improvement in thyroid overactivity.\par \par Plan:  Decrease methimazole to 10 mg two daily from three daily and repeat labs before next vitis.  \par \par \par Feb 14, 2022     [employer]\par \par PCP: Dr. Lilly fax 9 \par           Onc:  Dr. Luis Alberto Love p   fax [phone]              \par            .\par            CC: Hyperthyroid  12/19/2011 -diagnosis (RAIU 46 %, TSI and TBII elevated)\par             u/s at MRI 11/2012 and 2/2013: simple goiter\par               1/2012 - started methimazole\par             Elevated BMI (but losing weight)\par \par             Elevated PC BS \par             Lung cancer\par \par Taking methimazole 10 mg x 3 tabs a day \par [facility] labs from 1/21/2022 included\par creatinine 0.64\par TSH 0.03\par T4 16.3\par T3 231\par WBC 2.5\par Hct 22.1\par polys 943\par lymphs 1270   \par plats 61\par \par \par   2/10/2022  [facility] labs:\par \par BS 98\par creatinine 0.61\par A1c 4.3 \par albumin 3.2\par TSH < 0.01\par T4 18\par T3 401\par Hct 26.3\par B12 656\par folate 8.1 \par vit D 32 \par \par After 4 rounds of chemo, he was put on a holiday.\par Started on iron.\par \par Impression:  Mr. Ulloa rarely takes his thyroid medicaton because he feels well.\par Plan:  I strongly encouraged him to take the methimazole religiously and to have a follow up visit 3/7/2022.\par after updated labs.     \par \par \par \par Jan 24, 2022     iPhone\par \par PCP: Dr. Lilly fax 6 \par           Onc:  Dr. Sahu [facility] p   fax [phone]         io      \par            .\par            CC: Hyperthyroid  12/19/2011 -diagnosis (RAIU 46 %, TSI and TBII elevated)\par             u/s at MRI 11/2012 and 2/2013: simple goiter\par               1/2012 - started methimazole\par             Elevated BMI (but losing weight)\par \par             Elevated PC BS \par             Lung cancer\par \par Taking methimazole 10 mg x 3 tabs a day \par [facility] labs from 1/21/2022 include\par creatinine 0.64\par TSH 0.03\par T4 16.3\par T3 231\par WBC 2.5\par Hct 22.1\par polys 943\par lymphs 1270   \par plats 61\par \par Impression:  Tolerating the hyperthyroidism without symptoms.\par Taking methimazole 30 mg daily.\par Says he is going for PET-CT soon.\par \par Plan:  Same Rx for now.\par I will check with Dr. Love regarding his perspective on the CBC.  [done]\par ROV Feb 14 after labs.\par \par \par \par \par \par Tu 10, 2022     iPhone\par \par PCP: Dr. Lilly fax 8 \par            .\par            CC: Hyperthyroid  12/19/2011 -diagnosis (RAIU 46 %, TSI and TBII elevated)\par             u/s at MRI 11/2012 and 2/2013: simple goiter\par               1/2012 - started methimazole\par             Elevated BMI (but losing weight)\par \par             Elevated PC BS \par             Lung cancer\par \par 56 yo called today to request [facility] form and said he would make appointment after that.   Wife told the  that he could not do a telephone visit because he was in the hospital.\par Did not add up:     So by 5:00 pm today we did a videochat.\par I had provided his records to Endocrine team when he was in [facility].\par He is currently taking methimazole 10 mg TID.\par \par Says he feels well.\par \par Plan:   He will ask his oncologist to fax update on current status.\par I have emailed [facility] for to Mr. Ulloa and he will call me one week later to discuss methimazole dose\par and I will again advise I-131.    \par \par \par \par 11/4/ 2021     \par \par Mr. Ulloa calls to report that after July visit, he saw ENT, Dr. Andres for scratchy throat\par then went for CT+ chest, told of mass, biopsy positive for small cell lung cancer and\par he is now seeing oncologist, Dr. Victorino Che at [facility] and will start chemotherapy this week and\par expects that to be followed by radiation (w/o surgery).\par He has no recent TFTs.\par Last T4 elevated.\par He has a [facility] form.\par Agrees to go to [facility] tomorrow and to call me a few days later.  -jh\par \par \par \par \par \par Jul 09, 2021    iPhone \par \par PCP: Dr. Lilly fax 7 \par            .\par            CC: Hyperthyroid  12/19/2011 -diagnosis (RAIU 46 %, TSI and TBII elevated)\par             u/s at MRI 11/2012 and 2/2013: simple goiter\par               1/2012 - started methimazole\par             Elevated BMI (but losing weight)\par \par             Elevated PC BS \par \par Has a problem with sensor in his care.\par Because of that, he could not come in toay and did not have a chance to stop by [facility] for \par lab tests but says he will go to [facility] tomorrow.  \par \par Last labs showed T4 18.\par He is supposed to see ENT for scratchy voice\par Went for US thyroid at [facility]:  "Heterogeneous thyroid with ill defined nodularity....No discrete nodules.  The appearance is similar to the prior study of February 2013..."\par \par Currently taking methimazole 10 mg x 3.\par Will go to [facility], call me a few days later.\par He made appt to see me END of Nov.\par Today I indicated he also needs Nov 2.  \par \par \par Feb 24, 2021     iPhone  \par \par PCP: Dr. Lilly fax 9 \par            .\par            CC: Hyperthyroid  12/19/2011 -diagnosis (RAIU 46 %, TSI and TBII elevated)\par             u/s at MRI 11/2012 and 2/2013: simple goiter\par               1/2012 - started methimazole\par             Elevated BMI (but losing weight)\par \par             Elevated PC BS \par \par 55 yo  will be going to [facility] for today or tomorrow.\par Most recent labs ([facility]) from November 20 included\par glucose 100 mg/dl\par T4 13\par T3  187 \par TSH < 0.01\par A1c 5.9 %\par u/a  urobilinogen 3 (<2)\par \par            .\par Impression:  Hyperthyroid d/t Graves diseease.\par Plan:  Updated labs - he said he will go to [facility] in next day or so and then call me on weekend for resutls\par ROV by erarly May\par          \par \par \par \par Nov 20, 2020     in person\par \par PCP: Dr. Lilly fax 1 \par            .\par            CC: Hyperthyroid  12/19/2011 -diagnosis (RAIU 46 %, TSI and TBII elevated)\par             u/s at MRI 11/2012 and 2/2013: simple goiter\par               1/2012 - started methimazole\par             Elevated BMI (but losing weight)\par \par             Elevated PC BS \par            .\par          \par Has methimazole 5 mg tablets, x 4 a day (total of 20 mg).    Ran out a few days ago.\par Sept 4, 2020 T3   222 ()    T4  13    TSH undetectable  \par \par Examination: \par Constitutional:  Alert, well nourished, healthy appearance, no acute distress \par Eyes:  No proptosis, no lid lag\par Thyroid:\par Pulmonary:  No respiratory distress, no accessory muscle use; normal rate and effort\par Cardiac:  Normal rate\par Vascular: \par Endocrine:  No stigmata of Cushing’s Syndrome\par Musculoskeletal:  Normal gait, no involuntary movements\par Neurology:  No tremors\par Affect/Mood/Psych:  Oriented x 3; normal affect, normal insight/judgement, normal mood \par .\par \par Impression: TBII elevated, TFTs eleevated despite methimazole.\par Plan:  advised I-131 or thyroid surgery.    He prefers the methimazole for now.  \par \par \par Sep 03, 2020     in person\par \par PCP: Dr. Lilly fax 1 [phone]\par            .\par            CC: Hyperthyroid  12/19/2011 -diagnosis (RAIU 46 %, TSI and TBII elevated)\par             u/s at MRI 11/2012 and 2/2013: simple goiter\par               1/2012 - started methimazole\par             Elevated BMI (but losing weight)\par \par             Elevated PC BS \par            .\par            Taking methimazole 5 mg tablets, three daily.\par \par \par Mar 24, 2020\par \par PCP: Dr. Lilly fax 1 [phone]\par            .\par            CC: Hyperthyroid  12/19/2011 -diagnosis (RAIU 46 %, TSI and TBII elevated)\par             u/s at MRI 11/2012 and 2/2013: simple goiter\par               1/2012 - started methimazole\par             Elevated BMI (but losing weight)\par \par             Elevated PC BS \par            .\par            Taking methimazole 5 mg tablets, three daily.\par \par Impression:  TSh suppressed, T4 and T3 both elevated as is TSI.\par He is not interested in I-131 or thyroid surgery.  In fact, it is challenging to convince to stay on the\par methimazole since he feels so well.  \par \par Plan:  Increase methimazole from 15 mg a day to 20 mg/day. \par \par \par \par Oct 02, 2019\par \par PCP: Dr. Lilly fax 1 [phone]\par            .\par            CC: Hyperthyroid\par             12/19/2011 -diagnosis (RAIU 46 %, TSI and TBII elevated)\par             u/s at MRI 11/2012 and 2/2013: simple goiter\par  1/2012 - started methimazole\par             Elevated BMI (but losing weight)\par            .\par          Taking methimazole 5 mg, one daily alternating with two daily.\par \par Now taking methimazole two 5 mg daily to On license of [facility].  \par Went for labs yesterday....not back.\par \par Plan:  Same Rx and labs before January visit.  \par \par \par \par \par \par May 24, 2019\par    PCP: Dr. Lilly fax 1 [phone]\par            .\par            CC: Hyperthyroid\par             12/19/2011 -diagnosis (RAIU 46 %, TSI and TBII elevated)\par             u/s at MRI 11/2012 and 2/2013: simple goiter\par  1/2012 - started methimazole\par             Elevated BMI (but losing weight)\par            .\par          Taking methimazole 5 mg, one daily alternating with two daily.\par \par \par Today I reminded him of the potential side effects of methimazole including rash, hives, arthritis, hepatitis, agranulocytosis and death.    He prefers to take methimazole rather than the know safe option of I-131 or even surgery.\par So,he will increase the methimazole to 5 mg, two tablets daily, repeat labs before next visit in August.  \par As he reports:    "I feel great".  \par \par \par January 15, 2019\par \par \par \par \par Taking methimazole 5 mg daily.\par Recent labs at [facility] show suppressed TSH.\par Plan:  Increase methiamzole to 5 mg tablets:  one on odd days, two on even days.\par Repeat labs at [facility] before next visit in April.  \par To [facility] in [place] for refills.  \par \par Previous notes from [employer] appended below.\par \par  Feb 1, 2018\par            .\par            PCP: Dr. Lilly fax 1 [phone]\par            .\par            CC: Hyperthyroid\par             12/19/2011 -diagnosis (RAIU 46 %, TSI and TBII elevated)\par             u/s at MRI 11/2012 and 2/2013: simple goiter\par             1/2012 - started methimazole\par             Elevated BMI (but losing weight)\par            .\par            Taking methimazole 5 mg x 8 tabs a week.\par            1/27/2018\par            TSH 0.49\par             (<140)\par            .\par            Impression: Doing well.\par            Declines I-131\par            .\par            Pllan: Same Rx\par            Warned of potential side effects of methimazole. \par            .\par            .\par            ==\par            .\par            Oct 4, 2017\par            .\par            PCP: Dr. Lilly fax 1 [phone]\par            .\par            CC: Hyperthyroid\par             12/19/2011 -diagnosis (RAIU 46 %, TSI and TBII elevated)\par             u/s at MRI 11/2012 and 2/2013: simple goiter\par             1/2012 - started methimazole\par             Elevated BMI (but losing weight)\par            .\par            Taking methimazole 5 mg x 9 tabs a week.\par            TFTs in good range.\par            .\par            OGTT completely normal.\par            .\par            Has been exercise and has lost weight. \par            .\par            Plan: Dec methimazole to 8 ills a week \par            ROV in January.\par            .\par            ==\par            .\par            June 21, 2017\par            .\par            PCP: Dr. Lilly fax 1 [phone]\par            .\par            CC: Hyperthyroid\par             12/19/2011 -diagnosis (RAIU 46 %, TSI and TBII elevated)\par             u/s at MRI 11/2012 and 2/2013: simple goiter\par             1/2012 - started methimazole\par             Elevated BMI\par            .\par             Smoker\par             A1c 6.0\par             Decreased HDL/Elevated LDL\par            .\par            Taking methimazole 5 mg tablets.\par            Because of lowish TSH on 8 tablets a week, at last visit in January, dose incresed to 9 tablets a week.\par            .\par            Recent labs show TSH in normal range.\par            .\par            Impression: Hyperthyroidism well controlled on methimazole 5 mg tablets, 9 tablets a week. Has not gone into remission despite treatment with methimazole in early 2012 and thus he is not likely to go into a remission. He is awasre that his family history, elevted BMI, elevated LDL, lowe HDL are all cardiac risk factors and will discuss further with Dr. Lilly. \par            .\par            Plan: Same Rx. Formal 2 hour OGTT. \par            .\par            .\par            ==\par            .\par            January 30, 2017\par            .\par            PCP: Dr. Lilly fax 1 [phone]\par            .\par            CC: Hyperthyroid\par             12/19/2011 -diagnosis (RAIU 46 %, TSI and TBII elevated)\par             u/s at MRI 11/2012 and 2/2013: simple goiter\par             1/2012 - started methimazole\par             Elevated BMI\par            .\par             Smoker\par            .\par            Last visit TSH low on methimazole 5 mg x 8 tabs/week so\par            dose inc to 5 mg x 9 tabs/week and\par            1/26/2017 TFTs show TSH 1.3\par            \par            HDL 31 \par            .\par            Impression: Thyroi controlled on 9 tabls methimazole a week.\par            .\par            Plan: methiamzole 5 mg x 9 tabs aweek.\par            ROV 3 months. - \par            .\par            ==\par            .\par            October 18, 2016\par            .\par            PCP: Dr. Lilly fax 1 [phone]\par            .\par            CC: Hyperthyroid\par             12/19/2011 -diagnosis (RAIU 46 %, TSI and TBII elevated)\par             u/s at MRI 11/2012 and 2/2013: simple goiter\par             1/2012 - started methimazole\par             Elevated BMI\par            .\par             Smoker\par            .\par            Doing well on methimazole 5 mg, two on Sunday.\par            TSH is slightly low.\par            .\par            Plan: methimazole 5 mg 8 a week to 9 a week.\par            ROV -\par            .\par            ==\par            .\par            August 1, 2016\par            .\par            PCP: Dr. Lilly fax 1 [phone]\par            .\par            CC: Hyperthyroid\par             12/19/2011 -diagnosis (RAIU 46 %, TSI and TBII elevated)\par             u/s at MRI 11/2012 and 2/2013: simple goiter\par             1/2012 - started methimazole\par             Elevated BMI\par            .\par            52 yo returns for hyperthyroidism due to Graves' disease, diagnosed in December of 2011 and started on methimazole in January of 2012. \par            He got dose of methimazole down to 2.5 mg alternating with 5 mg, but then TFTs went back up. \par            Last here in May and is on methimazole 5 mg daily.\par            Most recent blood tests ([facility]) on\par            7/29/2016 included\par            TSH 0.25 ***\par            T3 112\par            T4 8. \par            .\par            He feels well.\par            .\par            Impression: Not going into remission and not likely to. He is well aware of this, but prefers to stay on methimazole. He understands the risks of methimazole and the risks of thyroid overactivity. He understands that I-131 and surgery are definitive options to treat the thyroid overactivity.\par            .\par            Plan: As per his wishes, continue methimazole 5 mg daily, but take two pills on Sundays. Updated TFTs, LFTs, CBC within 8 weeks. Thank you. -jh\par            .\par            ==\par            .\par            May 10, 2016\par            .\par            PCP: Dr. Lilly fax 1 [phone]\par            .\par            CC: Hyperthyroid\par             12/19/2011 -diagnosis\par             1/2012 - started methimazole\par            .Feels well on methimazole 5 mg daily.\par            .\par            Impression: Not going into remission.\par            Not interested in I-131 or surgery.\par            Aware of risks of methimazole\par            Suppressed TSH indicates he is still mildly hypethyroid. \par            .\par            Plan: Updated TFTs before next visit in July. \par            .\par            ==\par            .\par            January 27, 2016\par            .\par            PCP: Dr. Lilly fax 1 [phone]\par            .\par            CC: Hyperthyroid\par             12/19/2011 -diagnosis\par             1/2012 - started methimazole\par            .\par            He has been noting occasional palpitations,\par            occasional shakiness.\par            .\par            Plan: Increase methimazole to 5 mg daily for now and repeat TFTs in 10 weeks. He is not yet interested in I-131\par            ROV in 11 weeks. -jh\par            .\par            ==\par            .\par            July 24, 2015\par            .\par            PCP: Dr. Lilly fax 1 [phone]\par            .\par            CC: Hyperthyroid\par             12/19/2011 -diagnosis\par             1/2012 - started methimazole\par            .\par             Remains on methimazole 5 mg daily.\par            TFTs in good range.\par            Plan: Decrease methimazole to 2.5 mg alternating with 5 mg and\par            ROV after next TFTs in a few months\par            .\par            ==\par            .\par            April 3, 2015\par            .\par            PCP: Dr. Lilly fax 1 [phone]\par            .\par            CC: Hyperthyroid\par             12/19/2011 -diagnosis\par             1/2012 - started methimazole\par            .\par            Today he reports he feels well \par            .\par            Imp: On methimazole 10 alt with 5.\par            .\par            Plan: 5 mg daily.\par            ROV in July.\par            .\par            ==\par            Dec 19, 2014\par            PCP: Dr. Lilly fax 1 [phone]\par            CC: Hyperthyroid\par             12/19/2011 -diagnosis\par             1/2012 - started methimazole\par            .\par            Currently taking methimazole 10 mg alt with 5 mg last vist, dec from\par            10 mg daily.\par            Recenr TSI elev at 250 (< 140%)\par            T4 7.9 TSH 1.9\par            .\par            Impression: On methimazole for almost 3 years - not in remission but hyperthyroidism is controlled on 10 mg al with 5. TSI still elevated, also against chances of remission. \par            .\par            Plan: I-131 offered and declined. Same Rx. andn ROV in 3 1/2 months after labs.\par            .\par            ===\par            Sept 19, 2014\par            PCP: Dr. Lilly\par            CC: Hyperthyroid\par             12/19/2011 - Thyroid scan 46% update\par             TSI and TBII both elevated.\par             1/2012 - started methimazole\par            .\par            Currently on methimazole 10 mg daily. \par            In Feb 2014, TSH suppressed on 5 mg/day.\par            .\par            Recent TFTs are within normal (on 10 mg daily.\par            Sonogram Nov 2012 - normal.\par            .\par            Impression: As he did not go into a remission after two years of methimazole, the probability of a long term remission at this point is much decreased. However, there is room to lower the dose of methimazole from 10 mg daily.\par            .\par            Plan: Decrease to 10 mg alt with 5 mg, repeat labs in Nov and ROV Dec.\par

## 2023-10-03 ENCOUNTER — APPOINTMENT (OUTPATIENT)
Dept: ENDOCRINOLOGY | Facility: CLINIC | Age: 58
End: 2023-10-03
Payer: SELF-PAY

## 2023-10-03 PROCEDURE — 99215 OFFICE O/P EST HI 40 MIN: CPT | Mod: 95

## 2024-01-09 ENCOUNTER — APPOINTMENT (OUTPATIENT)
Dept: ENDOCRINOLOGY | Facility: CLINIC | Age: 59
End: 2024-01-09

## 2024-04-02 ENCOUNTER — APPOINTMENT (OUTPATIENT)
Dept: ENDOCRINOLOGY | Facility: CLINIC | Age: 59
End: 2024-04-02
Payer: MEDICARE

## 2024-04-02 DIAGNOSIS — E05.00 THYROTOXICOSIS WITH DIFFUSE GOITER W/OUT THYROTOXIC CRISIS OR STORM: ICD-10-CM

## 2024-04-02 DIAGNOSIS — R73.03 PREDIABETES.: ICD-10-CM

## 2024-04-02 DIAGNOSIS — E05.90 THYROTOXICOSIS, UNSPECIFIED W/OUT THYROTOXIC CRISIS OR STORM: ICD-10-CM

## 2024-04-02 PROCEDURE — 99215 OFFICE O/P EST HI 40 MIN: CPT

## 2024-04-02 RX ORDER — LEVOTHYROXINE SODIUM 0.15 MG/1
150 TABLET ORAL
Qty: 90 | Refills: 2 | Status: ACTIVE | COMMUNITY
Start: 2022-08-12 | End: 1900-01-01

## 2024-04-02 RX ORDER — METHIMAZOLE 10 MG/1
10 TABLET ORAL
Qty: 60 | Refills: 8 | Status: ACTIVE | COMMUNITY
Start: 1900-01-01 | End: 1900-01-01

## 2024-04-05 PROBLEM — E05.00 GRAVES DISEASE: Status: ACTIVE | Noted: 2020-03-24

## 2024-04-05 PROBLEM — E05.90 HYPERTHYROIDISM: Status: ACTIVE | Noted: 2019-03-06

## 2024-04-05 PROBLEM — R73.03 PREDIABETES: Status: ACTIVE | Noted: 2019-03-06

## 2024-04-05 NOTE — HISTORY OF PRESENT ILLNESS
[Home] : at home, [unfilled] , at the time of the visit. [Medical Office: ([facility])___] : at the medical office located in  [Verbal consent obtained from patient] : the patient, [unfilled] [FreeTextEntry1] : Apr 02, 2024     vid chat     PCP: Dr. Lilly fax 2            Onc:  Dr. Luis Alberto Love p   fax [phone]     at [address] [place] [address]                     .            CC: Hyperthyroid  12/19/2011 -diagnosis (RAIU 46 %, TSI and TBII elevated)             u/s at MRI 11/2012 and 2/2013: simple goiter               1/2012 - started methimazole             Elevated BMI (but losing weight)              Elevated PC BS    3/28/2024 A1c 5.8%             Lung cancer  60 yo   Developed marked hypothyroidism on methimazole, so added levothyroxine. Taking methimazole 10 mg x 2 in AM plus levothyroxine 125 mcg. "I feel great."    [facility] shows TSI and TBII still elevated.    Currently 4/2/2024 he is taking levothyroxine 137 x 7 1/2 tabs a week plus two methimazole.  Imp:  TSH still a bit elevated. Plan:  Take levothyroxine to 150 mcg daily daily.   as well as methimazole 20 mg daily.         Oct 03, 2023     - Videochat using Solo/Novavax   PCP: Dr. Lilly fax 9            Onc:  Dr. Luis Alberto Love p   fax [phone]     at [address] [place] [address]                     .            CC: Hyperthyroid  12/19/2011 -diagnosis (RAIU 46 %, TSI and TBII elevated)             u/s at MRI 11/2012 and 2/2013: simple goiter               1/2012 - started methimazole             Elevated BMI (but losing weight)              Elevated PC BS              Lung cancer  Developed marked hypothyroidism on methimazole, so added levothyroxine. Taking methimazole 10 mg x 2 in AM plus levothyroxine 125 mcg. "I feel great."    [facility] shows TSI and TBII still elevated.    Since last visit, developed rash on hands and feet and required steroid Rx by Dr. Love.   Completed a recent dosepack.     Lab tests at [facility] on 3/24/2023 included  creat 1.2 calcium 9.7 TSH 14.17 T4 5.6    Last visit advised to continue methimazole 10 mg x 2 daily in AM and increase the levothyroxine to 137 mcg daily and  Had repeat CT and PET scan ~ 9/2023 and told 98% cancer free. Did not get to go for labs.     He will go to [facility].  Has lots of energy. Taking LT4 137 and methimazole 20 mg daily    Apr 03, 2023        - Videochat using Solo/Novavax   PCP: Dr. Lilly fax 6            Onc:  Dr. Luis Alberto Love p   fax [phone]     at [address] [place] [address]                     .            CC: Hyperthyroid  12/19/2011 -diagnosis (RAIU 46 %, TSI and TBII elevated)             u/s at MRI 11/2012 and 2/2013: simple goiter               1/2012 - started methimazole             Elevated BMI (but losing weight)              Elevated PC BS              Lung cancer  Developed marked hypothyroidism on methimazole, so added levothyroxine. Taking methimazole 10 mg x 2 in AM plus levothyroxine 125 mcg. "I feel great."    [facility] shows TSI and TBII still elevated.    Since last visit, developed rash on hands and feet and required steroid Rx by Dr. Love.   Completed a recent dosepack.     Lab tests at [facility] on 3/24/2023 included  creat 1.2 calcium 9.7 TSH 14.17 T4 5.6    Plan: Continue methimazole 10 mg x 2 daily in AM and increase the levothyroxine to 137 mcg daily and ROV in October after [facility] labs in September and also schedule visit for January 2024 (now)     Sep 23, 2022     - Videochat using Solo/Novavax   PCP: Dr. Lilly fax 4            Onc:  Dr. Luis Alberto Love p   fax [phone]                          .            CC: Hyperthyroid  12/19/2011 -diagnosis (RAIU 46 %, TSI and TBII elevated)             u/s at MRI 11/2012 and 2/2013: simple goiter               1/2012 - started methimazole             Elevated BMI (but losing weight)              Elevated PC BS              Lung cancer  Developed marked hypothyroidism on methimazole, so added levothyroxine. Taking methimazole 10 mg x 2 in AM plus levothyroxine 125 mcg. "I feel great."    [facility] shows TSI and TBII still elevated.    Impression:   Hyperthyroidism due to Graves' Disease.    Plan:  Same Rx.    He has no follow up appointments scheduled.   He will set up 3 visit.     Aug 12, 2022    iPhone  PCP: Dr. Lilly fax 3            Onc:  Dr. Luis Alberto Love p   fax [phone]                          .            CC: Hyperthyroid  12/19/2011 -diagnosis (RAIU 46 %, TSI and TBII elevated)             u/s at MRI 11/2012 and 2/2013: simple goiter               1/2012 - started methimazole             Elevated BMI (but losing weight)              Elevated PC BS              Lung cancer  Taking methimazole 10 mg x 3 daily.  Imp:  Severely hypothyroid (even so says:  "But I feel fine") Severely non-compliant (I discusssed with his sister)  Plan:  Continue methimazole and start levothyroxine 125 mcg daily. ROV in about 6 weeks after labs.     Aug 07, 2022      iPhone  PCP: Dr. Lilly fax 0            Onc:  Dr. Luis Alberto Love p   fax [phone]                          .            CC: Hyperthyroid  12/19/2011 -diagnosis (RAIU 46 %, TSI and TBII elevated)             u/s at MRI 11/2012 and 2/2013: simple goiter               1/2012 - started methimazole             Elevated BMI (but losing weight)              Elevated PC BS              Lung cancer  Taking methimazole 10 mg x 3 daily. No recent lab tests. Sets up visit because he needs revills. Reports, as usual, "I feel great."  Imp/Plan:   Discussed compliance with patient and his sister. Long strategy session.   He has other complicated medical issues.    Mar 07, 2022      iPhone  PCP: Dr. Lilly fax 1            Onc:  Dr. Luis Alberto Love p   fax                           .            CC: Hyperthyroid  12/19/2011 -diagnosis (RAIU 46 %, TSI and TBII elevated)             u/s at MRI 11/2012 and 2/2013: simple goiter               1/2012 - started methimazole             Elevated BMI (but losing weight)              Elevated PC BS              Lung cancer  Taking methimazole 10 mg x 3 tabs a day   Done with chemotherapy and now on immunotherapy alone. Recent [facility] labs show big improvement in thyroid overactivity.  Plan:  Decrease methimazole to 10 mg two daily from three daily and repeat labs before next vitis.     Feb 14, 2022     [employer]  PCP: Dr. Lilly fax 2            Onc:  Dr. Luis Alberto Love p   fax [phone]                          .            CC: Hyperthyroid  12/19/2011 -diagnosis (RAIU 46 %, TSI and TBII elevated)             u/s at MRI 11/2012 and 2/2013: simple goiter               1/2012 - started methimazole             Elevated BMI (but losing weight)              Elevated PC BS              Lung cancer  Taking methimazole 10 mg x 3 tabs a day  [facility] labs from 1/21/2022 included creatinine 0.64 TSH 0.03 T4 16.3 T3 231 WBC 2.5 Hct 22.1 polys 943 lymphs 1270    plats 61     2/10/2022  [facility] labs:  BS 98 creatinine 0.61 A1c 4.3  albumin 3.2 TSH < 0.01 T4 18 T3 401 Hct 26.3 B12 656 folate 8.1  vit D 32   After 4 rounds of chemo, he was put on a holiday. Started on iron.  Impression:  Mr. Ulloa rarely takes his thyroid medicaton because he feels well. Plan:  I strongly encouraged him to take the methimazole religiously and to have a follow up visit 3/7/2022. after updated labs.         Jan 24, 2022     iPhone  PCP: Dr. Lilly fax 6            Onc:  Dr. Luis Alberto Alexander p   fax [phone]         [place]                  .            CC: Hyperthyroid  12/19/2011 -diagnosis (RAIU 46 %, TSI and TBII elevated)             u/s at MRI 11/2012 and 2/2013: simple goiter               1/2012 - started methimazole             Elevated BMI (but losing weight)              Elevated PC BS              Lung cancer  Taking methimazole 10 mg x 3 tabs a day  [facility] labs from 1/21/2022 include creatinine 0.64 TSH 0.03 T4 16.3 T3 231 WBC 2.5 Hct 22.1 polys 943 lymphs 1270    plats 61  Impression:  Tolerating the hyperthyroidism without symptoms. Taking methimazole 30 mg daily. Says he is going for PET-CT soon.  Plan:  Same Rx for now. I will check with Dr. Love regarding his perspective on the CBC.  [done] ROV Feb 14 after labs.      Tu 10, 2022     iPhone  PCP: Dr. Lilly fax 1 [phone]            .            CC: Hyperthyroid  12/19/2011 -diagnosis (RAIU 46 %, TSI and TBII elevated)             u/s at MRI 11/2012 and 2/2013: simple goiter               1/2012 - started methimazole             Elevated BMI (but losing weight)              Elevated PC BS              Lung cancer  58 yo called today to request [facility] form and said he would make appointment after that.   Wife told the  that he could not do a telephone visit because he was in the hospital. Did not add up:     So by 5:00 pm today we did a videochat. I had provided his records to Endocrine team when [facility] was in [facility]. He is currently taking methimazole 10 mg TID.  Says he feels well.  Plan:   He will ask his oncologist to fax update on current status. I have emailed [facility] for to Mr. Ulloa and he will call me one week later to discuss methimazole dose and I will again advise I-131.        11/4/ 2021       Mr. Ulloa calls to report that after July visit, he saw ENT, Dr. Andres for scratchy throat then went for CT+ chest, told of mass, biopsy positive for small cell lung cancer and he is now seeing oncologist, Dr. Victorino Che at [facility] and will start chemotherapy this week and expects that to be followed by radiation (w/o surgery). He has no recent TFTs. Last T4 elevated. He has a [facility] form. Agrees to go to [facility] tomorrow and to call me a few days later.  -      Jul 09, 2021    iPhone   PCP: Dr. Lilly fax 2             .            CC: Hyperthyroid  12/19/2011 -diagnosis (RAIU 46 %, TSI and TBII elevated)             u/s at MRI 11/2012 and 2/2013: simple goiter               1/2012 - started methimazole             Elevated BMI (but losing weight)              Elevated PC BS   Has a problem with sensor in his care. Because of that, he could not come in toay and did not have a chance to stop by [facility] for  lab tests but says he will go to [facility] tomorrow.    Last labs showed T4 18. He is supposed to see ENT for scratchy voice Went for [facility] thyroid at [facility]:  "Heterogeneous thyroid with ill defined nodularity....No discrete nodules.  The appearance is similar to the prior study of February 2013..."  Currently taking methimazole 10 mg x 3. Will go to [facility], call me a few days later. He made appt to see me END of Nov. Today I indicated he also needs Nov 2.     Feb 24, 2021     iPhone  [phone]  PCP: Dr. Lilly fax 0             .            CC: Hyperthyroid  12/19/2011 -diagnosis (RAIU 46 %, TSI and TBII elevated)             u/s at MRI 11/2012 and 2/2013: simple goiter               1/2012 - started methimazole             Elevated BMI (but losing weight)              Elevated PC BS   57 yo  will be going to [facility] for today or tomorrow. Most recent labs ([facility]) from November 20 included glucose 100 mg/dl T4 13 T3  187  TSH < 0.01 A1c 5.9 % u/a  urobilinogen 3 (<2)             . Impression:  Hyperthyroid d/t Graves diseease. Plan:  Updated labs - he said he will go to [facility] in next day or so and then call me on weekend for resutls ROV by erarly May              Nov 20, 2020     in person  PCP: Dr. Lilly fax 3             .            CC: Hyperthyroid  12/19/2011 -diagnosis (RAIU 46 %, TSI and TBII elevated)             u/s at MRI 11/2012 and 2/2013: simple goiter               1/2012 - started methimazole             Elevated BMI (but losing weight)              Elevated PC BS             .           Has methimazole 5 mg tablets, x 4 a day (total of 20 mg).    Ran out a few days ago. Sept 4, 2020 T3   222 ()    T4  13    TSH undetectable    Examination:  Constitutional:  Alert, well nourished, healthy appearance, no acute distress  Eyes:  No proptosis, no lid lag Thyroid: Pulmonary:  No respiratory distress, no accessory muscle use; normal rate and effort Cardiac:  Normal rate Vascular:  Endocrine:  No stigmata of Cushing's Syndrome Musculoskeletal:  Normal gait, no involuntary movements Neurology:  No tremors Affect/Mood/Psych:  Oriented x 3; normal affect, normal insight/judgement, normal mood  .  Impression: TBII elevated, TFTs eleevated despite methimazole. Plan:  advised I-131 or thyroid surgery.    He prefers the methimazole for now.     Sep 03, 2020     in person  PCP: Dr. Lilly fax 1 [phone]            .            CC: Hyperthyroid  12/19/2011 -diagnosis (RAIU 46 %, TSI and TBII elevated)             u/s at MRI 11/2012 and 2/2013: simple goiter               1/2012 - started methimazole             Elevated BMI (but losing weight)              Elevated PC BS             .            Taking methimazole 5 mg tablets, three daily.   Mar 24, 2020  PCP: Dr. Lilly fax 1 [phone]            .            CC: Hyperthyroid  12/19/2011 -diagnosis (RAIU 46 %, TSI and TBII elevated)             u/s at MRI 11/2012 and 2/2013: simple goiter               1/2012 - started methimazole             Elevated BMI (but losing weight)              Elevated PC BS             .            Taking methimazole 5 mg tablets, three daily.  Impression:  TSh suppressed, T4 and T3 both elevated as is TSI. He is not interested in I-131 or thyroid surgery.  In fact, it is challenging to convince to stay on the methimazole since he feels so well.    Plan:  Increase methimazole from 15 mg a day to 20 mg/day.     Oct 02, 2019  PCP: Dr. Lilly fax 1 [phone]            .            CC: Hyperthyroid             12/19/2011 -diagnosis (RAIU 46 %, TSI and TBII elevated)             u/s at MRI 11/2012 and 2/2013: simple goiter  1/2012 - started methimazole             Elevated BMI (but losing weight)            .          Taking methimazole 5 mg, one daily alternating with two daily.  Now taking methimazole two 5 mg daily to [facility].   Went for labs yesterday....not back.  Plan:  Same Rx and labs before January visit.        May 24, 2019    PCP: Dr. Lilly fax 1 [phone]            .            CC: Hyperthyroid             12/19/2011 -diagnosis (RAIU 46 %, TSI and TBII elevated)             u/s at MRI 11/2012 and 2/2013: simple goiter  1/2012 - started methimazole             Elevated BMI (but losing weight)            .          Taking methimazole 5 mg, one daily alternating with two daily.   Today I reminded him of the potential side effects of methimazole including rash, hives, arthritis, hepatitis, agranulocytosis and death.    He prefers to take methimazole rather than the know safe option of I-131 or even surgery. So,he will increase the methimazole to 5 mg, two tablets daily, repeat labs before next visit in August.   As he reports:    "I feel great".     January 15, 2019     Taking methimazole 5 mg daily. Recent labs at [facility] show suppressed TSH. Plan:  Increase methiamzole to 5 mg tablets:  one on odd days, two on even days. Repeat labs at [facility] before next visit in April.   To [facility] in [place] for refills.    Previous notes from [employer] appended below.   Feb 1, 2018            .            PCP: Dr. Lilly fax 1 [phone]            .            CC: Hyperthyroid             12/19/2011 -diagnosis (RAIU 46 %, TSI and TBII elevated)             u/s at MRI 11/2012 and 2/2013: simple goiter             1/2012 - started methimazole             Elevated BMI (but losing weight)            .            Taking methimazole 5 mg x 8 tabs a week.            1/27/2018            TSH 0.49             (<140)            .            Impression: Doing well.            Declines I-131            .            Pllan: Same Rx            Warned of potential side effects of methimazole.             .            .            ==            .            Oct 4, 2017            .            PCP: Dr. Lilly fax 1 [phone]            .            CC: Hyperthyroid             12/19/2011 -diagnosis (RAIU 46 %, TSI and TBII elevated)             u/s at MRI 11/2012 and 2/2013: simple goiter             1/2012 - started methimazole             Elevated BMI (but losing weight)            .            Taking methimazole 5 mg x 9 tabs a week.            TFTs in good range.            .            OGTT completely normal.            .            Has been exercise and has lost weight.             .            Plan: Dec methimazole to 8 ills a week             ROV in January.            .            ==            .            June 21, 2017            .            PCP: Dr. Lilly fax 1 [phone]            .            CC: Hyperthyroid             12/19/2011 -diagnosis (RAIU 46 %, TSI and TBII elevated)             u/s at MRI 11/2012 and 2/2013: simple goiter             1/2012 - started methimazole             Elevated BMI            .             Smoker             A1c 6.0             Decreased HDL/Elevated LDL            .            Taking methimazole 5 mg tablets.            Because of lowish TSH on 8 tablets a week, at last visit in January, dose incresed to 9 tablets a week.            .            Recent labs show TSH in normal range.            .            Impression: Hyperthyroidism well controlled on methimazole 5 mg tablets, 9 tablets a week. Has not gone into remission despite treatment with methimazole in early 2012 and thus he is not likely to go into a remission. He is awasre that his family history, elevted BMI, elevated LDL, lowe HDL are all cardiac risk factors and will discuss further with Dr. Lilly.             .            Plan: Same Rx. Formal 2 hour OGTT.             .            .            ==            .            January 30, 2017            .            PCP: Dr. Lilly fax 1 [phone]            .            CC: Hyperthyroid             12/19/2011 -diagnosis (RAIU 46 %, TSI and TBII elevated)             u/s at MRI 11/2012 and 2/2013: simple goiter             1/2012 - started methimazole             Elevated BMI            .             Smoker            .            Last visit TSH low on methimazole 5 mg x 8 tabs/week so            dose inc to 5 mg x 9 tabs/week and            1/26/2017 TFTs show TSH 1.3                        HDL 31             .            Impression: Thyroi controlled on 9 tabls methimazole a week.            .            Plan: methiamzole 5 mg x 9 tabs aweek.            ROV 3 months. -             .            ==            .            October 18, 2016            .            PCP: Dr. Lilly fax 1 [phone]            .            CC: Hyperthyroid             12/19/2011 -diagnosis (RAIU 46 %, TSI and TBII elevated)             u/s at MRI 11/2012 and 2/2013: simple goiter             1/2012 - started methimazole             Elevated BMI            .             Smoker            .            Doing well on methimazole 5 mg, two on Sunday.            TSH is slightly low.            .            Plan: methimazole 5 mg 8 a week to 9 a week.            ROV -            .            ==            .            August 1, 2016            .            PCP: Dr. Lilly fax 1 [phone]            .            CC: Hyperthyroid             12/19/2011 -diagnosis (RAIU 46 %, TSI and TBII elevated)             u/s at MRI 11/2012 and 2/2013: simple goiter             1/2012 - started methimazole             Elevated BMI            .            50 yo returns for hyperthyroidism due to Graves' disease, diagnosed in December of 2011 and started on methimazole in January of 2012.             He got dose of methimazole down to 2.5 mg alternating with 5 mg, but then TFTs went back up.             Last here in May and is on methimazole 5 mg daily.            Most recent blood tests ([facility]) on            7/29/2016 included            TSH 0.25 ***            T3 112            T4 8.             .            He feels well.            .            Impression: Not going into remission and not likely to. He is well aware of this, but prefers to stay on methimazole. He understands the risks of methimazole and the risks of thyroid overactivity. He understands that I-131 and surgery are definitive options to treat the thyroid overactivity.            .            Plan: As per his wishes, continue methimazole 5 mg daily, but take two pills on Sundays. Updated TFTs, LFTs, CBC within 8 weeks. Thank you. -jh            .            ==            .            May 10, 2016            .            PCP: Dr. Lilly fax 1 [phone]            .            CC: Hyperthyroid             12/19/2011 -diagnosis             1/2012 - started methimazole            .Feels well on methimazole 5 mg daily.            .            Impression: Not going into remission.            Not interested in I-131 or surgery.            Aware of risks of methimazole            Suppressed TSH indicates he is still mildly hypethyroid.             .            Plan: Updated TFTs before next visit in July.             .            ==            .            January 27, 2016            .            PCP: Dr. Lilly fax 1 [phone]            .            CC: Hyperthyroid             12/19/2011 -diagnosis             1/2012 - started methimazole            .            He has been noting occasional palpitations,            occasional shakiness.            .            Plan: Increase methimazole to 5 mg daily for now and repeat TFTs in 10 weeks. He is not yet interested in I-131            ROV in 11 weeks. -            .            ==            .            July 24, 2015            .            PCP: Dr. Lilly fax 1 [phone]            .            CC: Hyperthyroid             12/19/2011 -diagnosis             1/2012 - started methimazole            .             Remains on methimazole 5 mg daily.            TFTs in good range.            Plan: Decrease methimazole to 2.5 mg alternating with 5 mg and            ROV after next TFTs in a few months            .            ==            .            April 3, 2015            .            PCP: Dr. Lilly fax 1 [phone]            .            CC: Hyperthyroid             12/19/2011 -diagnosis             1/2012 - started methimazole            .            Today he reports he feels well             .            Imp: On methimazole 10 alt with 5.            .            Plan: 5 mg daily.            ROV in July.            .            ==            Dec 19, 2014            PCP: Dr. Lilly fax 1 [phone]            CC: Hyperthyroid             12/19/2011 -diagnosis             1/2012 - started methimazole            .            Currently taking methimazole 10 mg alt with 5 mg last vist, dec from            10 mg daily.            Recenr TSI elev at 250 (< 140%)            T4 7.9 TSH 1.9            .            Impression: On methimazole for almost 3 years - not in remission but hyperthyroidism is controlled on 10 mg al with 5. TSI still elevated, also against chances of remission.             .            Plan: I-131 offered and declined. Same Rx. andn ROV in 3 1/2 months after labs.            .            ===            Sept 19, 2014            PCP: Dr. Lilly            CC: Hyperthyroid             12/19/2011 - Thyroid scan 46% update             TSI and TBII both elevated.             1/2012 - started methimazole            .            Currently on methimazole 10 mg daily.             In Feb 2014, TSH suppressed on 5 mg/day.            .            Recent TFTs are within normal (on 10 mg daily.            Sonogram Nov 2012 - normal.            .            Impression: As he did not go into a remission after two years of methimazole, the probability of a long term remission at this point is much decreased. However, there is room to lower the dose of methimazole from 10 mg daily.            .            Plan: Decrease to 10 mg alt with 5 mg, repeat labs in Nov and ROV Dec.

## 2024-04-24 ENCOUNTER — APPOINTMENT (OUTPATIENT)
Dept: ENDOCRINOLOGY | Facility: CLINIC | Age: 59
End: 2024-04-24

## 2024-07-09 ENCOUNTER — APPOINTMENT (OUTPATIENT)
Dept: ENDOCRINOLOGY | Facility: CLINIC | Age: 59
End: 2024-07-09

## 2024-10-08 ENCOUNTER — APPOINTMENT (OUTPATIENT)
Dept: ENDOCRINOLOGY | Facility: CLINIC | Age: 59
End: 2024-10-08
Payer: MEDICARE

## 2024-10-08 DIAGNOSIS — E05.90 THYROTOXICOSIS, UNSPECIFIED W/OUT THYROTOXIC CRISIS OR STORM: ICD-10-CM

## 2024-10-08 DIAGNOSIS — E05.00 THYROTOXICOSIS WITH DIFFUSE GOITER W/OUT THYROTOXIC CRISIS OR STORM: ICD-10-CM

## 2024-10-08 DIAGNOSIS — E11.9 TYPE 2 DIABETES MELLITUS W/OUT COMPLICATIONS: ICD-10-CM

## 2024-10-08 PROCEDURE — 99215 OFFICE O/P EST HI 40 MIN: CPT

## 2025-03-05 ENCOUNTER — APPOINTMENT (OUTPATIENT)
Dept: ENDOCRINOLOGY | Facility: CLINIC | Age: 60
End: 2025-03-05
Payer: MEDICARE

## 2025-03-05 DIAGNOSIS — E05.00 THYROTOXICOSIS WITH DIFFUSE GOITER W/OUT THYROTOXIC CRISIS OR STORM: ICD-10-CM

## 2025-03-05 PROCEDURE — 99214 OFFICE O/P EST MOD 30 MIN: CPT | Mod: 2W

## 2025-08-05 ENCOUNTER — APPOINTMENT (OUTPATIENT)
Dept: ENDOCRINOLOGY | Facility: CLINIC | Age: 60
End: 2025-08-05
Payer: MEDICARE

## 2025-08-05 DIAGNOSIS — E05.90 THYROTOXICOSIS, UNSPECIFIED W/OUT THYROTOXIC CRISIS OR STORM: ICD-10-CM

## 2025-08-05 PROCEDURE — G2211 COMPLEX E/M VISIT ADD ON: CPT | Mod: 2W

## 2025-08-05 PROCEDURE — 99215 OFFICE O/P EST HI 40 MIN: CPT | Mod: 2W

## 2025-09-15 LAB — HBA1C MFR BLD HPLC: 5.7
